# Patient Record
Sex: FEMALE | Race: WHITE | NOT HISPANIC OR LATINO | Employment: FULL TIME
[De-identification: names, ages, dates, MRNs, and addresses within clinical notes are randomized per-mention and may not be internally consistent; named-entity substitution may affect disease eponyms.]

---

## 2017-04-24 ENCOUNTER — RECORDS - HEALTHEAST (OUTPATIENT)
Dept: ADMINISTRATIVE | Facility: OTHER | Age: 22
End: 2017-04-24

## 2017-05-30 ENCOUNTER — RECORDS - HEALTHEAST (OUTPATIENT)
Dept: ADMINISTRATIVE | Facility: OTHER | Age: 22
End: 2017-05-30

## 2017-10-31 ENCOUNTER — RECORDS - HEALTHEAST (OUTPATIENT)
Dept: ADMINISTRATIVE | Facility: OTHER | Age: 22
End: 2017-10-31

## 2017-12-21 ENCOUNTER — RECORDS - HEALTHEAST (OUTPATIENT)
Dept: ADMINISTRATIVE | Facility: OTHER | Age: 22
End: 2017-12-21

## 2017-12-27 ENCOUNTER — RECORDS - HEALTHEAST (OUTPATIENT)
Dept: ADMINISTRATIVE | Facility: OTHER | Age: 22
End: 2017-12-27

## 2018-01-08 ENCOUNTER — RECORDS - HEALTHEAST (OUTPATIENT)
Dept: ADMINISTRATIVE | Facility: OTHER | Age: 23
End: 2018-01-08

## 2018-01-10 ENCOUNTER — RECORDS - HEALTHEAST (OUTPATIENT)
Dept: ADMINISTRATIVE | Facility: OTHER | Age: 23
End: 2018-01-10

## 2018-02-08 ENCOUNTER — RECORDS - HEALTHEAST (OUTPATIENT)
Dept: ADMINISTRATIVE | Facility: OTHER | Age: 23
End: 2018-02-08

## 2018-03-01 ENCOUNTER — DOCUMENTATION ONLY (OUTPATIENT)
Dept: FAMILY MEDICINE | Facility: OTHER | Age: 23
End: 2018-03-01

## 2018-03-01 RX ORDER — DIPHENOXYLATE HYDROCHLORIDE AND ATROPINE SULFATE 2.5; .025 MG/1; MG/1
1 TABLET ORAL DAILY
COMMUNITY
Start: 2015-11-03

## 2018-03-01 RX ORDER — VENLAFAXINE HYDROCHLORIDE 150 MG/1
150 CAPSULE, EXTENDED RELEASE ORAL
COMMUNITY
Start: 2016-05-17

## 2018-03-25 ENCOUNTER — HEALTH MAINTENANCE LETTER (OUTPATIENT)
Age: 23
End: 2018-03-25

## 2018-07-03 ENCOUNTER — OFFICE VISIT - HEALTHEAST (OUTPATIENT)
Dept: FAMILY MEDICINE | Facility: CLINIC | Age: 23
End: 2018-07-03

## 2018-07-03 DIAGNOSIS — F41.1 GAD (GENERALIZED ANXIETY DISORDER): ICD-10-CM

## 2018-07-03 DIAGNOSIS — F31.32 BIPOLAR AFFECTIVE DISORDER, CURRENTLY DEPRESSED, MODERATE (H): ICD-10-CM

## 2018-07-03 DIAGNOSIS — Z97.5 IUD (INTRAUTERINE DEVICE) IN PLACE: ICD-10-CM

## 2018-07-03 DIAGNOSIS — Z13.228 SCREENING FOR METABOLIC DISORDER: ICD-10-CM

## 2018-07-03 DIAGNOSIS — R09.82 PND (POST-NASAL DRIP): ICD-10-CM

## 2018-07-03 DIAGNOSIS — F41.0 PANIC ATTACK: ICD-10-CM

## 2018-07-03 DIAGNOSIS — Z11.3 SCREEN FOR STD (SEXUALLY TRANSMITTED DISEASE): ICD-10-CM

## 2018-07-03 DIAGNOSIS — Z00.00 ENCOUNTER FOR ROUTINE HISTORY AND PHYSICAL EXAM IN FEMALE: ICD-10-CM

## 2018-07-03 LAB
CHOLEST SERPL-MCNC: 208 MG/DL
FASTING STATUS PATIENT QL REPORTED: YES
HBA1C MFR BLD: 4.9 % (ref 3.5–6.1)
HDLC SERPL-MCNC: 74 MG/DL
LDLC SERPL CALC-MCNC: 116 MG/DL
TRIGL SERPL-MCNC: 92 MG/DL
TSH SERPL DL<=0.005 MIU/L-ACNC: 0.86 UIU/ML (ref 0.3–5)

## 2018-07-03 ASSESSMENT — MIFFLIN-ST. JEOR: SCORE: 1517.45

## 2018-07-06 LAB
C TRACH DNA SPEC QL PROBE+SIG AMP: NEGATIVE
N GONORRHOEA DNA SPEC QL NAA+PROBE: NEGATIVE

## 2018-07-09 ENCOUNTER — COMMUNICATION - HEALTHEAST (OUTPATIENT)
Dept: FAMILY MEDICINE | Facility: CLINIC | Age: 23
End: 2018-07-09

## 2018-10-09 ENCOUNTER — HOSPITAL ENCOUNTER (OUTPATIENT)
Dept: BEHAVIORAL HEALTH | Facility: CLINIC | Age: 23
Discharge: HOME | End: 2018-10-09
Attending: PSYCHOLOGIST | Admitting: PSYCHOLOGIST
Payer: COMMERCIAL

## 2018-10-09 PROCEDURE — 90791 PSYCH DIAGNOSTIC EVALUATION: CPT

## 2018-10-09 ASSESSMENT — PAIN SCALES - GENERAL: PAINLEVEL: NO PAIN (0)

## 2018-10-09 NOTE — PROGRESS NOTES
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 female.  She lives with a friend in [place] and has been in that apartment since August.  Prior to that, she was living in [place] for School.  She graduated with a degree in [employer] and moved to the [facility].  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  She is currently on short term disability with work and would like to return when symptoms are better managed.  Her support people include a variety of friends and an aunt.  Her professional supports include Ronit BAILEY (Therapist and [facility]) and is interested in getting a psychiatrist.    Jazz is diagnosed with bipolar II, currently depressed and an unspecified anxiety disorder.  She reported feeling sad, hopeless, helpless, worthless, irritable,andnervous.  She has tension, low interest in activities, low self confidence, and is uneasy in crowds.  She reports thoughts around breaking or smashing things, racing thoughts, and ruminations.  She has difficulties with concentration and  memory but is sometimes hyperfocused on tasks.  She reports having no to low appetite and sleeping too much.  She has low motivation and sudden changes in energy.  She reports crying easily, avoiding places due to fear, and some higher than usual self confidence.  She reports making poor decisions and impulsivity in substance use (alcohol and marijuana) and sex.  She denies symptoms of psychosis or trauma.  She reports history of drinking alcohol daily to the point of intoxication.  She reports current use is a few times a week but knows it is to help cope with symptoms and wants to use skills to cope rather than substances.  She reports smoking daily to cope with symptoms of low appetite and anxiety and wants to use skills to cope rather than substances.    Jazz has no history of psychiatric hospitalizations.  She reports one suicide attempt (9/21/18) via overdose on her own medications.  She was brought to the ER and released.  She reports it was done impulsively and she had not been planning it.  She reports  Current suicidal ideation is low with no plan or intent to act but that impulsivity can cause this to change quickly.  She reports history of cutting starting at age 17 with intermittent action and current urges during times of stress.  Most recent action was same day as suicide attempt.  She is self referred to work on symptoms of depression and anxiety.  She reports current stressors include finances, life changes (graduating, moving, starting a new job, and a romantic break up), conflict with family, and drinking and smoking too much.  She will be placed in the partial program starting 10/10/2018    Prognosis is Fair. Without the recommended intervention, the client is likely to experience the following consequences of their symptoms: increased symptoms, decreased functioning, worsening of symptoms, need for higher level of care.    Referrals to services required by statute or rule:   Report to  child/adult protection services was NA.   Referral to another professional/service is not indicated at this time..    Program Recommendation: [facility] () .  Starting 10/10/18    Assessment Completed by: Lisette Anaya

## 2018-10-10 ENCOUNTER — HOSPITAL ENCOUNTER (OUTPATIENT)
Dept: BEHAVIORAL HEALTH | Facility: CLINIC | Age: 23
End: 2018-10-10
Attending: PSYCHIATRY & NEUROLOGY
Payer: COMMERCIAL

## 2018-10-10 PROCEDURE — H0035 MH PARTIAL HOSP TX UNDER 24H: HCPCS

## 2018-10-10 ASSESSMENT — ANXIETY QUESTIONNAIRES
7. FEELING AFRAID AS IF SOMETHING AWFUL MIGHT HAPPEN: SEVERAL DAYS
5. BEING SO RESTLESS THAT IT IS HARD TO SIT STILL: SEVERAL DAYS
GAD7 TOTAL SCORE: 12
IF YOU CHECKED OFF ANY PROBLEMS ON THIS QUESTIONNAIRE, HOW DIFFICULT HAVE THESE PROBLEMS MADE IT FOR YOU TO DO YOUR WORK, TAKE CARE OF THINGS AT HOME, OR GET ALONG WITH OTHER PEOPLE: VERY DIFFICULT
1. FEELING NERVOUS, ANXIOUS, OR ON EDGE: NEARLY EVERY DAY
2. NOT BEING ABLE TO STOP OR CONTROL WORRYING: SEVERAL DAYS
3. WORRYING TOO MUCH ABOUT DIFFERENT THINGS: SEVERAL DAYS
6. BECOMING EASILY ANNOYED OR IRRITABLE: NEARLY EVERY DAY

## 2018-10-10 ASSESSMENT — PATIENT HEALTH QUESTIONNAIRE - PHQ9: 5. POOR APPETITE OR OVEREATING: MORE THAN HALF THE DAYS

## 2018-10-11 ENCOUNTER — HOSPITAL ENCOUNTER (OUTPATIENT)
Dept: BEHAVIORAL HEALTH | Facility: CLINIC | Age: 23
End: 2018-10-11
Attending: PSYCHIATRY & NEUROLOGY
Payer: COMMERCIAL

## 2018-10-11 PROCEDURE — H0035 MH PARTIAL HOSP TX UNDER 24H: HCPCS

## 2018-10-11 ASSESSMENT — ANXIETY QUESTIONNAIRES: GAD7 TOTAL SCORE: 12

## 2018-10-11 ASSESSMENT — PATIENT HEALTH QUESTIONNAIRE - PHQ9: SUM OF ALL RESPONSES TO PHQ QUESTIONS 1-9: 20

## 2018-10-11 NOTE — PROGRESS NOTES
Area of Treatment Focus:  Symptom Management and Develop / Improve Independent Living Skills    Therapeutic Interventions/Treatment Strategies:  Support, Feedback, Safety Assessment, Structured Activity, Problem Solving and Education    Response to Treatment Strategies:  Accepted Feedback, Gave Feedback, Listened, Focused on Goals, Attentive, Accepted Support and Alert    Name of Group:  Group therapy [id number], Interpersonal Effectiveness [id number]  Group Participants:     Description and Outcome:  Group therapy  Client reported feeling upset.  She said she had a difficult interaction with her mother.  She said her mother is telling people that the client does not know about her suicide attempt and the client feels exposed. She said she believes her mother is doing it to get attention.  She said she has never been parented by her mother and her mother has no boundaries.  She said that brought out guilt and shame about what happened when she was manic and  as a result she had urges to use alcohol.  She said she did not drink but it was difficult.  Discussed acceptance and ways to manage regret to move forward with her recovery.  No SI reported.  Client verbalized understanding of session content by problem solving with group members and accepting feedback.  Interpersonal Effectiveness  Client participated in an exercise on values clarification.  Client used a card sort to examine important that are driving behavior, evaluation, and attitudes toward herself and her interactions.  Client reported difficulty choosing which values are important to her as the current stress makes everything seem more important.  Client benefitted from this group by problem solving and increasing self-awareness.      Is this a Weekly Review of the Progress on the Treatment Plan?  No

## 2018-10-11 NOTE — PROGRESS NOTES
Area of Treatment Focus:  Symptom Management and Develop / Improve Independent Living Skills    Therapeutic Interventions/Treatment Strategies:  Support, Feedback, Safety Assessment, Structured Activity, Problem Solving and Education    Response to Treatment Strategies:  Accepted Feedback, Gave Feedback, Listened, Focused on Goals, Attentive, Accepted Support and Alert    Name of Group:  Self-awareness 2-3    Group Participants: 5/5    Description and Outcome:  Discussed concept of self-compassion, reviewed three core elements of self-compassion, discussed value of self-compassion in reducing stress and supporting mental health, considered options for improving self-compassion.    Is this a Weekly Review of the Progress on the Treatment Plan?  No

## 2018-10-12 ENCOUNTER — HOSPITAL ENCOUNTER (OUTPATIENT)
Dept: BEHAVIORAL HEALTH | Facility: CLINIC | Age: 23
End: 2018-10-12
Attending: PSYCHIATRY & NEUROLOGY
Payer: COMMERCIAL

## 2018-10-12 DIAGNOSIS — F31.81 BIPOLAR 2 DISORDER (H): ICD-10-CM

## 2018-10-12 DIAGNOSIS — F31.81 BIPOLAR II DISORDER (H): Primary | ICD-10-CM

## 2018-10-12 PROCEDURE — H0035 MH PARTIAL HOSP TX UNDER 24H: HCPCS

## 2018-10-12 RX ORDER — LAMOTRIGINE 25 MG/1
TABLET ORAL
Qty: 240 TABLET | Refills: 0 | Status: SHIPPED | OUTPATIENT
Start: 2018-10-12

## 2018-10-12 NOTE — PROGRESS NOTES
Area of Treatment Focus:  Symptom Management and Develop / Improve Independent Living Skills    Therapeutic Interventions/Treatment Strategies:  Support, Feedback, Safety Assessment, Structured Activity, Problem Solving and Education    Response to Treatment Strategies:  Accepted Feedback, Gave Feedback, Listened, Focused on Goals, Attentive, Accepted Support and Alert    Name of Group:  Group psychotherapy 9-10, Network Development 10-11    Group Participants: 5/5    Description and Outcome:  Jazz participated in daily mindfulness practice. Says she spent time with her best friend's sister yesterday, went to get some Halloween things and this was enjoyable. Also set boundaries with her mother, who apparently called to tell Jazz how difficult Jazz's suicide attempt and struggles have been for her. Says she has figured out that she just cannot help anyone else with processing their needs and feelings about the situation right now. Affirmed this. Says she went to bed early and has been doing so of late, sleeps a lot more than usual. Talked about having mixed mood states: depression but while feeling really energetic and agitated. Says this state is the kind she was in when she impulsively overdosed. Shared what she remembers of that event and that she reached out to a friend when she realized she had done something she did not want to do, which led to hospitalization and then this program. Talked about how her roommate has been very distant and avoidant of her since then, though this person is considered her best friend. She notes that this friend's sister has attempted suicide 11 times and so the friend is likely partly reacting to that. Discussed value of support when stabilizing a health crisis and possibility of asking support people about how they feel comfortable being supportive while she works on getting better. Noted that there is often negotiation involved and it is okay to ask for support.  Encouraged Jazz to go at her own pace and proceed in a way that feels okay to her.    Discussed value of effective support in managing mental health issues, as well as common behavior of people who have depression to never ask for support. Completed exercise to begin identifying support needs and possible ways to work through barriers to asking for support.    Is this a Weekly Review of the Progress on the Treatment Plan?  No

## 2018-10-12 NOTE — PROGRESS NOTES
Area of Treatment Focus:  Symptom Management, Develop / Improve Independent Living Skills     Therapeutic Interventions/Treatment Strategies:  Support, Feedback, Structured Activity, Problem Solving and Education    Response to Treatment Strategies:  Accepted Feedback, Listened, Focused on Goals, Attentive, Accepted Support     Group Participants: 5 of 6 absence due to hospitalization  Description and Outcome:  Relax and Review    Name of Group:  Relax and Review [id number]  Client participated in an education group on values.  Discussed client s identified values and the affect her values are having on her life.  Information was presented on flexibility, adaptability, and change.  Client identified a value she wants to focus on increasing in her life and created actions steps for achieving her goal.  Client to work on increasing self-acceptance. Client benefitted from the session by creating action steps and problem solving with the group to increase follow through and work on goal.    Is this a Weekly Review of the Progress on the Treatment Plan?  Yes.      Are Treatment Plan Goals being addressed?  Yes, continue treatment goals      Are Treatment Plan Strategies to Address Goals Effective?  Yes, continue treatment strategies

## 2018-10-12 NOTE — PROGRESS NOTES
"Adult Mental Health Partial Hospitalization Group Therapy Progress Note     Date: 10/10/18    Client Initial Individualized Goals for Treatment: \"Stability,  Be able to focus. Increase self worth\".     Initial Treatment suggestions for the client during the time between Diagnostic Assessment and completion of the Individualized Treatment Plan:  Follow Safety Plan  Abstain from Substance Use   Ask for more information, support and/or assistance as needed.  Follow up with providers/community supports as needed: Therapist weekly, Looking for psychiatry,   Report increases or changes in symptoms to staff.  Report any personal safety concerns to staff.   Take medications as prescribed.  Report medication changes and/or side effects to staff.  Attend and participate in groups as scheduled or notify staff if unable to do so.  Report any use of substances to staff as this may impact your symptoms and/or personal safety.  Notify staff if you have any other issues that need to be addressed. This may include any current abuse / neglect / exploitation or other vulnerability.  Follow recommendations of your treatment team and discuss concerns if not in  agreement.     Area of Treatment Focus:  Symptom Management and Develop / Improve Independent Living Skills    Therapeutic Interventions/Treatment Strategies:  Support, Safety Assessments, Structured Activity, Problem Solving, Clarification and Education    Response to Treatment Strategies:  Accepted Feedback, Listened, Focused on Goals, Attentive, Accepted Support and Alert    Name of Group:  OT life skills clinic: mental health management  Time: 1:00-1:50  Group member attendance: 5 of 7     Description and Outcome: Jazz attended and participated in an structured life skills group where purposeful experiential intervention focuses on psychoeducation, exploration, practice, and generalizing taught independent living skills. Through the use of supportive social interactions, " structured therapeutic and functional tasks in context, group members work towards stabilizing and managing mental health symptoms for improved participation and function in valued roles, routines, relationships, and independent living.     Provided client with rationale and purpose of the OT life skills clinic and possible ways to use her time and energy to work on her mental health goals. Jazz endorses finding creating and expressing herself with media is a big part of who she is and easily chooses and initiates an unstructured self expression activity where she processes her bipolar disease. She easily exchanges validation and support with her peers. Anxious affect but able to stay regulated throughout session. Jazz  would benefit from additional opportunities to practice the content to be able to generalize it to her everyday life with increased intentionality, consistency, and efficacy in support of her psychiatric recovery. Will continue to monitor and assess and set appropriate goals with Jazz in subsequent sessions.     Is this a Weekly Review of the Progress on the Treatment Plan?  Yes.      Are Treatment Plan Goals being addressed?  Yes, continue treatment goals      Are Treatment Plan Strategies to Address Goals Effective?  Yes, continue treatment strategies      Are there any current contracts in place?  No

## 2018-10-12 NOTE — PROGRESS NOTES
"Adult Mental Health Partial Hospitalization Group Therapy Progress Note     Date: 10/11/18    Client Initial Individualized Goals for Treatment: \"Stability,  Be able to focus. Increase self worth\".     Initial Treatment suggestions for the client during the time between Diagnostic Assessment and completion of the Individualized Treatment Plan:  Follow Safety Plan  Abstain from Substance Use   Ask for more information, support and/or assistance as needed.  Follow up with providers/community supports as needed: Therapist weekly, Looking for psychiatry,   Report increases or changes in symptoms to staff.  Report any personal safety concerns to staff.   Take medications as prescribed.  Report medication changes and/or side effects to staff.  Attend and participate in groups as scheduled or notify staff if unable to do so.  Report any use of substances to staff as this may impact your symptoms and/or personal safety.  Notify staff if you have any other issues that need to be addressed. This may include any current abuse / neglect / exploitation or other vulnerability.  Follow recommendations of your treatment team and discuss concerns if not in  agreement.     Area of Treatment Focus:  Symptom Management and Develop / Improve Independent Living Skills    Therapeutic Interventions/Treatment Strategies:  Support, Safety Assessments, Structured Activity, Problem Solving, Clarification and Education    Response to Treatment Strategies:  Accepted Feedback, Listened, Focused on Goals, Attentive, Accepted Support and Alert    Name of Group:  OT life skills clinic: mental health management  Time: 1:00-1:50  Group member attendance: 5 of 6     Description and Outcome: Jazz attended and participated in an structured life skills group where purposeful experiential intervention focuses on psychoeducation, exploration, practice, and generalizing taught independent living skills. Through the use of supportive social interactions, " structured therapeutic and functional tasks in context, group members work towards stabilizing and managing mental health symptoms for improved participation and function in valued roles, routines, relationships, and independent living.     Jazz continues work on her creative expression project and shares her process with her peers. She presents with a calm even affect in the clinic today, stating she feels very comfortable with this type of therapeutic approach. She asks questions about DBT, wondering if it would be a good fit for her. She easily exchanges validation and support with her peers. Jazz  would benefit from additional opportunities to practice the content to be able to generalize it to her everyday life with increased intentionality, consistency, and efficacy in support of her psychiatric recovery. Will continue to monitor and assess and set appropriate goals with Jazz in subsequent sessions.     Is this a Weekly Review of the Progress on the Treatment Plan?  No.
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
## 2018-10-15 ENCOUNTER — HOSPITAL ENCOUNTER (OUTPATIENT)
Dept: BEHAVIORAL HEALTH | Facility: CLINIC | Age: 23
End: 2018-10-15
Attending: PSYCHIATRY & NEUROLOGY
Payer: COMMERCIAL

## 2018-10-15 PROCEDURE — H0035 MH PARTIAL HOSP TX UNDER 24H: HCPCS

## 2018-10-15 NOTE — PROGRESS NOTES
Adult Mental Health Outpatient   Group Therapy Progress Note     Client Initial Individualized Goals for Treatment:      See Initial Treatment suggestions for the client during the time between Diagnostic Assessment and completion of the Master Individualized Treatment Plan.     Initial Treatment suggestions for the client during the time between Diagnostic Assessment and completion of the Individualized Treatment Plan:  Follow Safety Plan   Ask for more information, support and/or assistance as needed.  Follow up with providers/community supports as needed:   Report increases or changes in symptoms to staff.  Report any personal safety concerns to staff.   Take medications as prescribed.  Report medication changes and/or side effects to staff.  Attend and participate in groups as scheduled or notify staff if unable to do so.  Report any use of substances to staff as this may impact your symptoms and/or perrsonal safety.  Notify staff if you have any other issues that need to be addressed. This may include any current abuse / neglect / exploitation or other vulnerability.  Follow recommendations of your treatment team and discuss concerns if not in agreement.  Area of Treatment Focus:  Symptom Management, Develop / Improve Independent Living Skills      Therapeutic Interventions/Treatment Strategies:  Support, Feedback, Structured Activity, Problem Solving and Education     Response to Treatment Strategies:  Accepted Feedback, Listened, Focused on Goals, Attentive, Accepted Support      Group Participants: 5 of 6 absence due to hospitalization  Description and Outcome:  Relax and Review     Name of Group:  Relax and Review [id number]  Client participated in an education group on values.  Discussed client s identified values and the affect her values are having on her life.  Information was presented on flexibility, adaptability, and change.  Client identified a value she wants to focus on increasing in her life and  created actions steps for achieving her goal.  Client to work on increasing self-acceptance. Client benefitted from the session by creating action steps and problem solving with the group to increase follow through and work on goal.     Is this a Weekly Review of the Progress on the Treatment Plan?  Yes.       Are Treatment Plan Goals being addressed?  Yes, continue treatment goals        Are Treatment Plan Strategies to Address Goals Effective?  Yes, continue treatment strategies

## 2018-10-15 NOTE — PROGRESS NOTES
"Adult Mental Health Outpatient Group Therapy Progress Note        Client Initial Individualized Goals for Treatment: \"Stability,  Be able to focus. Increase self worth\".     See Initial Treatment suggestions for the client during the time between Diagnostic Assessment and completion of the Master Individualized Treatment Plan.     Initial Treatment suggestions for the client during the time between Diagnostic Assessment and completion of the Individualized Treatment Plan:  Follow Safety Plan   Ask for more information, support and/or assistance as needed.  Follow up with providers/community supports as needed:   Report increases or changes in symptoms to staff.  Report any personal safety concerns to staff.   Take medications as prescribed.  Report medication changes and/or side effects to staff.  Attend and participate in groups as scheduled or notify staff if unable to do so.  Report any use of substances to staff as this may impact your symptoms and/or perrsonal safety.  Notify staff if you have any other issues that need to be addressed. This may include any current abuse / neglect / exploitation or other vulnerability.  Follow recommendations of your treatment team and discuss concerns if not in agreement.      Area of Treatment Focus:  Symptom Management and Develop / Improve Independent Living Skills    Therapeutic Interventions/Treatment Strategies:  Support, Feedback, Safety Assessment, Structured Activity, Problem Solving and Education    Response to Treatment Strategies:  Accepted Feedback, Gave Feedback, Listened, Focused on Goals, Attentive, Accepted Support and Alert    Name of Group:  Anxiety Lab 2-3    Group Participants:  6 of 6    Description and Outcome:  Discussed impact of thinking about past and future on anxiety and emotional stress levels, as well as option of using a here/now focus to shift away from this type of triggering thinking. Reviewed ways to practice mindfulness that can promote " that shift to the moment to moment focus.    Is this a Weekly Review of the Progress on the Treatment Plan?  No

## 2018-10-15 NOTE — PROGRESS NOTES
"[facility]   Dr. Combs's Psychiatric Progress Note  10/15/2018      Patient:  Jazz Nuñez   Medical Record Number:  [id number]  :  1995          Interim History:   The patient's care was discussed with the treatment team and chart notes were reviewed.  Weekend was pretty good.  Slept 12 hours on Fri. Night.  Sat. Didn't sleep til 4 AM and slept 4 hours.  Cleaned last night.  Made self sleep last night and got 6 hours.  She aims for 7 to 8 hours.  Some chest tightness when gets anxious.  Some OCD: washing hand more than usual.  Has dark thoughts about \"death in general.\"  No plans to kill self.      Psychiatric ROS:  Mood:   pretty good today;  she was more down on Friday;  anxious at time;    Sleep:  All over the board;   Appetite:normal  Eating:normal  Energy Level:  good  Concentration/Memory Problems:    good;  gets \"hyperfocused\" on one thing;    Suicidal Thoughts:No  Homicidal Thoughts:No  Psychotic Symptoms: No  Medication Side Effects:No  Medication Compliance:Yes   Physical Complaints:negative         Medications:     PAST PSYCH MEDS:  Prozac, Wellbutrin, Effexor, lithium, Zoloft, Ativan, Paxil, Celexa, Lexapro, BuSpar, Xanax, Valium, Lamictal,     Current Outpatient Prescriptions   Medication Sig     BuPROPion HCl (WELLBUTRIN PO) Take by mouth 2 times daily    Gabapetin  Take 1 po tid      lamoTRIgine (LAMICTAL) 25 MG tablet Take 1 po daily x 1 week then increase every week by 25mg until reaching target dose of 200 mg/d     Multiple Vitamin (MULTI-VITAMINS) TABS Take 1 tablet by mouth daily     venlafaxine (EFFEXOR-XR) 150 MG 24 hr capsule 150 mg daily with food     No current facility-administered medications for this encounter.              Allergies:   Not on File         Psychiatric Examination:   There were no vitals taken for this visit.  Weight is 0 lbs 0 oz  There is no height or weight on file to calculate BMI.    Appearance:  awake, alert and " adequately groomed  Attitude:  cooperative  Eye Contact:  looking down a lot  Mood:    depressed and anxious  Affect:  brighter than what she reports;  mood incongruent;    Speech:  clear, coherent  Psychomotor Behavior:  no evidence of tardive dyskinesia, dystonia, or tics  Throught Process:  logical  Associations:  no loose associations  Thought Content:  no evidence of suicidal ideation or homicidal ideation and no evidence of psychotic thought  Insight:  good  Judgement:  intact  Oriented to:  time, person, and place  Attention Span and Concentration:  intact  Recent and Remote Memory:  intact  Gait:Normal    Risk/Potential for Dangerousness:  Multiple Active Diagnoses:HIGH  Self Care:LOW  Suicide:LOW  Assault:LOW  Self Injurious Behaviors:LOW  Inappropriate Sexual Behavior:LOW         Labs:   No results found for this or any previous visit (from the past 24 hour(s)).     No results found for this or any previous visit (from the past 1008 hour(s)).      Impression:   This is a 23 year old female continues PHP for mood stabilization.           DIagnoses:     Axis I:  Bipolar II disorder, depressed. Rule out bipolar 1 disorder, depressed.  History of obsessive-compulsive disorder.  Rule out alcohol use disorder.   Axis II:  Rule out personality disorder, not otherwise specified.   Axis III:  No diagnosis.            Plan:     Continue [facility].   Continued Effexor, but discontinued Wellbutrin.   Started Lamictal 25 mg daily and titrate weekly by 25 mg until reaching target dosage of 200 mg daily.   Continue gabapentin for now.   Ms. Nuñez plans to follow up with her current therapist and get a new psychiatrist at her therapist's office on [address] in [place].     Allan Combs MD

## 2018-10-15 NOTE — PROGRESS NOTES
Adult Mental Health Outpatient   Group Therapy Progress Note    Client Initial Individualized Goals for Treatment:     See Initial Treatment suggestions for the client during the time between Diagnostic Assessment and completion of the Master Individualized Treatment Plan.    Initial Treatment suggestions for the client during the time between Diagnostic Assessment and completion of the Individualized Treatment Plan:  Follow Safety Plan   Ask for more information, support and/or assistance as needed.  Follow up with providers/community supports as needed:   Report increases or changes in symptoms to staff.  Report any personal safety concerns to staff.   Take medications as prescribed.  Report medication changes and/or side effects to staff.  Attend and participate in groups as scheduled or notify staff if unable to do so.  Report any use of substances to staff as this may impact your symptoms and/or perrsonal safety.  Notify staff if you have any other issues that need to be addressed. This may include any current abuse / neglect / exploitation or other vulnerability.  Follow recommendations of your treatment team and discuss concerns if not in agreement.  Area of Treatment Focus:  Symptom Management, Develop / Improve Independent Living Skills     Therapeutic Interventions/Treatment Strategies:  Support, Feedback, Structured Activity, Problem Solving and Education    Response to Treatment Strategies:  Accepted Feedback, Listened, Focused on Goals, Attentive, Accepted Support     Group Participants: 5 of 6 absence due to hospitalization  Description and Outcome:  Relax and Review    Name of Group:  Relax and Review [id number]  Client participated in an education group on values.  Discussed client s identified values and the affect her values are having on her life.  Information was presented on flexibility, adaptability, and change.  Client identified a value she wants to focus on increasing in her life and created  actions steps for achieving her goal.  Client to work on increasing self-acceptance. Client benefitted from the session by creating action steps and problem solving with the group to increase follow through and work on goal.    Is this a Weekly Review of the Progress on the Treatment Plan?  Yes.      Are Treatment Plan Goals being addressed?  Yes, continue treatment goals      Are Treatment Plan Strategies to Address Goals Effective?  Yes, continue treatment strategies

## 2018-10-15 NOTE — PROGRESS NOTES
"Adult Mental Health Outpatient Group Therapy Progress Note        Client Initial Individualized Goals for Treatment: \"Stability,  Be able to focus. Increase self worth\".     See Initial Treatment suggestions for the client during the time between Diagnostic Assessment and completion of the Master Individualized Treatment Plan.     Initial Treatment suggestions for the client during the time between Diagnostic Assessment and completion of the Individualized Treatment Plan:  Follow Safety Plan   Ask for more information, support and/or assistance as needed.  Follow up with providers/community supports as needed:   Report increases or changes in symptoms to staff.  Report any personal safety concerns to staff.   Take medications as prescribed.  Report medication changes and/or side effects to staff.  Attend and participate in groups as scheduled or notify staff if unable to do so.  Report any use of substances to staff as this may impact your symptoms and/or perrsonal safety.  Notify staff if you have any other issues that need to be addressed. This may include any current abuse / neglect / exploitation or other vulnerability.  Follow recommendations of your treatment team and discuss concerns if not in agreement.      Area of Treatment Focus:  Symptom Management and Develop / Improve Independent Living Skills    Therapeutic Interventions/Treatment Strategies:  Support, Feedback, Safety Assessment, Structured Activity, Problem Solving and Education    Response to Treatment Strategies:  Accepted Feedback, Gave Feedback, Listened, Focused on Goals, Attentive, Accepted Support and Alert    Name of Group:  Group therapy [id number], Mental Health Management [id number]  Group Participants:  6 of 6    Description and Outcome:  Group therapy  Client reported having a quiet weekend.  She said she worked to stay sober despite urges to use.  She said her anxiety was very high and was physically distressing.  She was able to use " some meditation to calm herself and it seemed to help.  She said she was alone most of the weekend and did some feelings work and otherwise tried to decrease tension.  She reported no SI.  Client verbalized understanding of session content by problem solving with group members and accepting feedback.  Mental Health Management  Client participated in an education session on self-validation.  The biospsychosocial model of emotion regulation was presented in relation to emotions affecting thinking.  Discussed the effect of heightened emotions on self-talk and the increased tendency to distort thinking.  Client related experiences of distorted thinking and group members.  Self-validation was presented as a way to decrease emotions and increase clarity in thought.  Validation was defined and steps to practice for increased validation were presented.  Client reported understanding the information presented and willingness to try the skills. Client would benefit from continued practice of self-validation.    Is this a Weekly Review of the Progress on the Treatment Plan?  No

## 2018-10-16 ENCOUNTER — HOSPITAL ENCOUNTER (OUTPATIENT)
Dept: BEHAVIORAL HEALTH | Facility: CLINIC | Age: 23
End: 2018-10-16
Attending: PSYCHIATRY & NEUROLOGY
Payer: COMMERCIAL

## 2018-10-16 PROCEDURE — H0035 MH PARTIAL HOSP TX UNDER 24H: HCPCS

## 2018-10-16 NOTE — PROGRESS NOTES
"Adult Mental Health Partial Hospitalization Group Therapy Progress Note     Date: 10/12/18    Client Initial Individualized Goals for Treatment: \"Stability,  Be able to focus. Increase self worth\".     Initial Treatment suggestions for the client during the time between Diagnostic Assessment and completion of the Individualized Treatment Plan:  Follow Safety Plan  Abstain from Substance Use   Ask for more information, support and/or assistance as needed.  Follow up with providers/community supports as needed: Therapist weekly, Looking for psychiatry,   Report increases or changes in symptoms to staff.  Report any personal safety concerns to staff.   Take medications as prescribed.  Report medication changes and/or side effects to staff.  Attend and participate in groups as scheduled or notify staff if unable to do so.  Report any use of substances to staff as this may impact your symptoms and/or personal safety.  Notify staff if you have any other issues that need to be addressed. This may include any current abuse / neglect / exploitation or other vulnerability.  Follow recommendations of your treatment team and discuss concerns if not in  agreement.     Area of Treatment Focus:  Symptom Management and Develop / Improve Independent Living Skills    Therapeutic Interventions/Treatment Strategies:  Support, Safety Assessments, Structured Activity, Problem Solving, Clarification and Education    Response to Treatment Strategies:  Accepted Feedback, Listened, Focused on Goals, Attentive, Accepted Support and Alert    Name of Group:  OT life skills: mental health management  Time: 1:00-1:50  Group member attendance: 5 of 6     Description and Outcome: Jazz attended and participated in an structured life skills group where purposeful experiential intervention focuses on psychoeducation, exploration, practice, and generalizing taught independent living skills. Through the use of supportive social interactions, " structured therapeutic and functional tasks in context, group members work towards stabilizing and managing mental health symptoms for improved participation and function in valued roles, routines, relationships, and independent living.     Focus of today s session is on the psychological benefits of an experiential embodied mind practice to gain self-awareness of how mindfulness can help with emotional self-regulation.  Psychoeducation on mindfulness and ways to be mindful were reviewed and discussed, including active meditation practices such as Yoga and Naresh Chi. Naresh Chi is an evidence based practice that employs structured rhythmic patterns of movement that are synchronized with the individual's breathing, focused attention, and principles around the quality of the movement and postures (body based) to activate the parasympathetic nervous system and calm the mind. The teaching and practice of the active meditation practice of Naresh Chi Sun Style was provided by writer (Certified Naresh Chi for ).  All aspects of the practice were adapted to individual client needs with an emphasis on safety and comfort to enhance the practice for improved engagement in the embodied mind practice. Jazz demonstrates good focus and reports feeling comfortable with the gentle movements. Jazz reflects on how it reminds her of dance (ballet).  Jazz would benefit from additional opportunities to practice the content to be able to generalize it to her everyday life with increased intentionality, consistency, and efficacy in support of her psychiatric recovery.  Will continue to monitor and assess and set appropriate goals with Jazz in subsequent sessions.     Is this a Weekly Review of the Progress on the Treatment Plan?  Yes.      Are Treatment Plan Goals being addressed?  Yes, continue treatment goals      Are Treatment Plan Strategies to Address Goals Effective?  Yes, continue treatment  strategies      Are there any current contracts in place?  No

## 2018-10-16 NOTE — PROGRESS NOTES
"Adult Mental Health Partial Hospitalization Group Therapy Progress Note     Date: 10/15/18    Client Initial Individualized Goals for Treatment: \"Stability,  Be able to focus. Increase self worth\".     Treatment Goals:  1) Safety: Jazz will:    Notify staff when needing assistance to develop or implement a coping plan to manage suicidal or self injurious urges.    Use coping plan for safety, as needed.  2) Symptom Management: Jazz will:      Jazz will identify and use 2-3 skills for coping with anxiety.    Working on developing skills to manage and stabilize mood.     Work on developing self-compassion skills and boundaries.  3) In LIfe Skills Jazz will:     Learn, practice, apply 2 skills/strategies that support lifestyle balance/structure/routine with an emphasis on self compassion    Learn, practice, generalize 2 sensory or mindfulness based self regulation skills to decrease distress and improve participation in meaningful role, routines, and relationships.  4) Will develop an aftercare / transition plan by day of discharge    Area of Treatment Focus:  Symptom Management and Develop / Improve Independent Living Skills    Therapeutic Interventions/Treatment Strategies:  Support, Safety Assessments, Structured Activity, Problem Solving, Clarification and Education    Response to Treatment Strategies:  Accepted Feedback, Listened, Focused on Goals, Attentive, Accepted Support and Alert    Name of Group:  OT life skills: Goal integration  Time: 1:00-1:50  Group member attendance: 6 of 6     Description and Outcome: Jzaz attended and participated in an structured life skills group where purposeful experiential intervention focuses on psychoeducation, exploration, practice, and generalizing taught independent living skills. Through the use of supportive social interactions, structured therapeutic and functional tasks in context, group members work towards stabilizing and managing mental health " symptoms for improved participation and function in valued roles, routines, relationships, and independent living.     To support progress towards treatment goals and psychiatric recovery, group members were led through a structured process to integrate new learning, skills, and emerging self-awareness into their daily and weekly life. The process includes:   1. Brief psychoeducation on evidence around the neuro-science of change with regards to setting small achievable goals.  2. Brief education on components of self-compassion in context of setting goals provided.  3. Brief psychoeducation and practice of 2 positive psychology skills: GLAD and 3 good things.   4. Write smart goals for the next week in 3 of the following areas: self-compassion, mindfulness, connections, wellness, lifestyle balance, discharge planning, and coping skills.   5. Integrate the goals into their life using a provided weekly planner. Encouraged to highlight the highlights as the week progresses.  6. Sharing intentions with the group for accountability, and to also receive support from peers as the week progresses  Jazz presents with calm even affect today. she was able to set the following 3 SMART goals for the week in support of his psychiatric recovery:   1) Self Compassion: Do daily highlight of highlights this week.   2) Mindfulness: Meditate daily  3) Connecting: meet up with a friend 2x this week.    4) Self care: Complete the Sensory Profile with writer.      Validation and support provided. Jazz would benefit from additional opportunities to practice the content to be able to generalize it to her everyday life with increased intentionality, consistency, and efficacy in support of her psychiatric recovery. She worked towards goal number 3 today.    Is this a Weekly Review of the Progress on the Treatment Plan?  No.

## 2018-10-16 NOTE — PROGRESS NOTES
"Adult Mental Health Outpatient Group Therapy Progress Note        Client Initial Individualized Goals for Treatment: \"Stability,  Be able to focus. Increase self worth\".     See Initial Treatment suggestions for the client during the time between Diagnostic Assessment and completion of the Master Individualized Treatment Plan.     Initial Treatment suggestions for the client during the time between Diagnostic Assessment and completion of the Individualized Treatment Plan:  Follow Safety Plan   Ask for more information, support and/or assistance as needed.  Follow up with providers/community supports as needed:   Report increases or changes in symptoms to staff.  Report any personal safety concerns to staff.   Take medications as prescribed.  Report medication changes and/or side effects to staff.  Attend and participate in groups as scheduled or notify staff if unable to do so.  Report any use of substances to staff as this may impact your symptoms and/or perrsonal safety.  Notify staff if you have any other issues that need to be addressed. This may include any current abuse / neglect / exploitation or other vulnerability.  Follow recommendations of your treatment team and discuss concerns if not in agreement.      Area of Treatment Focus:  Symptom Management and Develop / Improve Independent Living Skills    Therapeutic Interventions/Treatment Strategies:  Support, Feedback, Safety Assessment, Structured Activity, Problem Solving and Education    Response to Treatment Strategies:  Accepted Feedback, Gave Feedback, Listened, Focused on Goals, Attentive, Accepted Support and Alert    Name of Group:  Group psychotherapy 1-2, Support Network Education 3-6    Group Participants:  5 of 6    Description and Outcome:  Jazz participated in daily mindfulness practice. Says she had a \"bad day\" yesterday and earlier today.  Says she had hoped to go to [facility] and update her license this morning, but felt very anxious and so " decided against it and just went back to bed. Says she slept 12 hours all told. She has had some interactions with a male friend who she has told she cannot talk to him about how she is doing or what is happening with her because he has made some comments to her that feel very uncomfortable to her about her scars and about her depression, leaving her with the impression that he fetishizes her illness or maybe romanticizes it in some way and this feels disgusting to her. She has been very angry about his continuing to try to contact take her every day despite telling him to stop trying to talk to her. She struggles with feeling bad for telling him she cannot talk to him anymore period. Discussed importance of boundaries and need for safety in relationships and recognizing that it is okay to take steps to increase safety. Briefly noted ways that can be done: verbally setting limits, ignoring, getting support from people in support system, even calling police in the case of stalking that does not stop.     Participated in viewing film on experience of depression, importance and role of support, importance of effective treatment. Discussed general ways to be supportive of people dealing with depression, anxiety, mental health problems. Completed exercise to more clearly articulate her support needs with close friend she invited to this group.    Is this a Weekly Review of the Progress on the Treatment Plan?  No

## 2018-10-17 ENCOUNTER — HOSPITAL ENCOUNTER (OUTPATIENT)
Dept: BEHAVIORAL HEALTH | Facility: CLINIC | Age: 23
End: 2018-10-17
Attending: PSYCHIATRY & NEUROLOGY
Payer: COMMERCIAL

## 2018-10-17 PROCEDURE — H0035 MH PARTIAL HOSP TX UNDER 24H: HCPCS

## 2018-10-17 NOTE — PROGRESS NOTES
"Adult Mental Health Outpatient Group Therapy Progress Note        Client Initial Individualized Goals for Treatment: \"Stability,  Be able to focus. Increase self worth\".     See Initial Treatment suggestions for the client during the time between Diagnostic Assessment and completion of the Master Individualized Treatment Plan.     Initial Treatment suggestions for the client during the time between Diagnostic Assessment and completion of the Individualized Treatment Plan:  Follow Safety Plan   Ask for more information, support and/or assistance as needed.  Follow up with providers/community supports as needed:   Report increases or changes in symptoms to staff.  Report any personal safety concerns to staff.   Take medications as prescribed.  Report medication changes and/or side effects to staff.  Attend and participate in groups as scheduled or notify staff if unable to do so.  Report any use of substances to staff as this may impact your symptoms and/or perrsonal safety.  Notify staff if you have any other issues that need to be addressed. This may include any current abuse / neglect / exploitation or other vulnerability.  Follow recommendations of your treatment team and discuss concerns if not in agreement.      Area of Treatment Focus:  Symptom Management and Develop / Improve Independent Living Skills    Therapeutic Interventions/Treatment Strategies:  Support, Feedback, Safety Assessment, Structured Activity, Problem Solving and Education    Response to Treatment Strategies:  Accepted Feedback, Gave Feedback, Listened, Focused on Goals, Attentive, Accepted Support and Alert    Name of Group:  Group psychotherapy 1-2, Support Network Education 3-6    Group Participants:  5 of 6    Description and Outcome:  Participated in discussion of emotional regulation skills and utilizing these to create a lifestyle that provides for more emotional resilience and how these can be used when dealing with anxiety and " depression.    Is this a Weekly Review of the Progress on the Treatment Plan?  No

## 2018-10-17 NOTE — PROGRESS NOTES
"Adult Mental Health Partial Hospitalization Group Therapy Progress Note     Date: 10/16/18    Client Initial Individualized Goals for Treatment: \"Stability,  Be able to focus. Increase self worth\".     Treatment Goals:  1) Safety: Jazz will:    Notify staff when needing assistance to develop or implement a coping plan to manage suicidal or self injurious urges.    Use coping plan for safety, as needed.  2) Symptom Management: Jazz will:      Jazz will identify and use 2-3 skills for coping with anxiety.    Working on developing skills to manage and stabilize mood.     Work on developing self-compassion skills and boundaries.  3) In LIfe Skills Jazz will:     Learn, practice, apply 2 skills/strategies that support lifestyle balance/structure/routine with an emphasis on self compassion    Learn, practice, generalize 2 sensory or mindfulness based self regulation skills to decrease distress and improve participation in meaningful role, routines, and relationships.  4) Will develop an aftercare / transition plan by day of discharge    Area of Treatment Focus:  Symptom Management and Develop / Improve Independent Living Skills    Therapeutic Interventions/Treatment Strategies:  Support, Safety Assessments, Structured Activity, Problem Solving, Clarification and Education    Response to Treatment Strategies:  Accepted Feedback, Listened, Focused on Goals, Attentive, Accepted Support and Alert    Name of Group:  OT life skills: Goal integration  Time: 2:00-2:50  Group member attendance: 5 of 6     Description and Outcome: Jazz attended and participated in an structured life skills group where purposeful experiential intervention focuses on psychoeducation, exploration, practice, and generalizing taught independent living skills. Through the use of supportive social interactions, structured therapeutic and functional tasks in context, group members work towards stabilizing and managing mental health " symptoms for improved participation and function in valued roles, routines, relationships, and independent living.     Topic today is on emotional self-regulation with an emphasis on becoming aware of needs and employing sensory input/modalities for improved distress tolerance. Concepts covered included identifying/rating level of nervous system arousal followed by  experiential learning about internal and external sensory input that they personally find calming or alerting. Psychoeducation on two brief sensory based self regulation skills was provided: 5 finger grounding, and TIPP (temperature, intense exercise, paced breathing, paired muscle relaxation). Jazz is engaged and was able to identify sensory input she found calming and some she found uncomfortable as she begins her journey on understanding her sensory needs. Writer talked with Jazz about completing a Sensory Profile as a good way to identify her behavior patterns, preferences and work towards gaining insight into self soothing. Validation and support provided. Jazz would benefit from additional opportunities to practice the content to be able to generalize it to her everyday life with increased intentionality, consistency, and efficacy in support of her psychiatric recovery. She worked towards goal number 3 today.    Is this a Weekly Review of the Progress on the Treatment Plan?  No.

## 2018-10-18 ENCOUNTER — HOSPITAL ENCOUNTER (OUTPATIENT)
Dept: BEHAVIORAL HEALTH | Facility: CLINIC | Age: 23
End: 2018-10-18
Attending: PSYCHIATRY & NEUROLOGY
Payer: COMMERCIAL

## 2018-10-18 PROCEDURE — H0035 MH PARTIAL HOSP TX UNDER 24H: HCPCS

## 2018-10-18 NOTE — PROGRESS NOTES
"Adult Mental Health Partial Hospitalization Group Therapy Progress Note     Date: 10/17/18    Client Initial Individualized Goals for Treatment: \"Stability,  Be able to focus. Increase self worth\".     Treatment Goals:  1) Safety: Jazz will:    Notify staff when needing assistance to develop or implement a coping plan to manage suicidal or self injurious urges.    Use coping plan for safety, as needed.  2) Symptom Management: Jazz will:      Jazz will identify and use 2-3 skills for coping with anxiety.    Working on developing skills to manage and stabilize mood.     Work on developing self-compassion skills and boundaries.  3) In LIfe Skills Jazz will:     Learn, practice, apply 2 skills/strategies that support lifestyle balance/structure/routine with an emphasis on self compassion    Learn, practice, generalize 2 sensory or mindfulness based self regulation skills to decrease distress and improve participation in meaningful role, routines, and relationships.  4) Will develop an aftercare / transition plan by day of discharge    Area of Treatment Focus:  Symptom Management and Develop / Improve Independent Living Skills    Therapeutic Interventions/Treatment Strategies:  Support, Safety Assessments, Structured Activity, Problem Solving, Clarification and Education    Response to Treatment Strategies:  Accepted Feedback, Listened, Focused on Goals, Attentive, Accepted Support and Alert    Name of Group:  OT life skills clinic: mental health management  Time: 1:00-1:50  Group member attendance: 6 of 7     Description and Outcome: Jazz attended and participated in an structured life skills group where purposeful experiential intervention focuses on psychoeducation, exploration, practice, and generalizing taught independent living skills. Through the use of supportive social interactions, structured therapeutic and functional tasks in context, group members work towards stabilizing and managing " mental health symptoms for improved participation and function in valued roles, routines, relationships, and independent living.     Jazz  iniitates and works independently on her chosen semi structured engagement. She reports she finds it very enjoyable and soothing. She endorses that she is working on negative self talk and quieting it as she works.. Validation and support provided. Writer administered the Adult Sensory Profile based on observations and self report of Jazz. Will score and review results with her later in the week. Jazz would benefit from additional opportunities to practice the content to be able to generalize it to her everyday life with increased intentionality, consistency, and efficacy in support of her psychiatric recovery. She worked towards goal number 3 today.    Is this a Weekly Review of the Progress on the Treatment Plan?  Yes.      Are Treatment Plan Goals being addressed?  Yes, continue treatment goals      Are Treatment Plan Strategies to Address Goals Effective?  Yes, continue treatment strategies      Are there any current contracts in place?  No

## 2018-10-18 NOTE — PROGRESS NOTES
"Adult Mental Health Outpatient Group Therapy Progress Note     Client Initial Individualized Goals for Treatment: \"Stability,  Be able to focus. Increase self worth\".      Treatment Goals:  1) Safety: Jazz will:    Notify staff when needing assistance to develop or implement a coping plan to manage suicidal or self injurious urges.    Use coping plan for safety, as needed.  2) Symptom Management: Jazz will:      Jazz will identify and use 2-3 skills for coping with anxiety.    Working on developing skills to manage and stabilize mood.     Work on developing self-compassion skills and boundaries.  3) In LIfe Skills Jazz will:     Learn, practice, apply 2 skills/strategies that support lifestyle balance/structure/routine with an emphasis on self compassion    Learn, practice, generalize 2 sensory or mindfulness based self regulation skills to decrease distress and improve participation in meaningful role, routines, and relationships.  4) Will develop an aftercare / transition plan by day of discharge       Area of Treatment Focus:  Symptom Management, Personal Safety, Community Resources/Discharge Planning and Develop / Improve Independent Living Skills    Therapeutic Interventions/Treatment Strategies:  Support, Feedback, Safety Assessments, Structured Activity, Problem Solving and Education    Response to Treatment Strategies:  Accepted Feedback, Gave Feedback, Listened, Focused on Goals, Attentive, Accepted Support and Alert     Name of Group: Group Therapy Date/Time: 10/17/2018 / 0900 to 0950; Group Participants: 6 of 8, 3 known absences  Name of Group: Aftercare planning Date/Time: 10/17/2018 / [id number]; Group Participants: 5 of 8, 3 known absences      Description and Outcome:  Client reported improved mood today. She said she has been bothered by mood lability and so far the day was going better.  She said she had a good education session with her roommate.  She said her roommate noticed some " things about her she was unaware of for herself.  She said she felt validated by her noticing and caring.  She said they created a plan and sent it to some friends who are also willing to be supportive.  No SI reported.  Client verbalized understanding of session content by problem solving with group members and accepting feedback.  Aftercare Planning  Client participated in an aftercare planning.  Client completed an exercise to assess needs, plan for skills to address the identified need, and create an action plan to follow through on creating a daily schedule. Client worked with group members to get ideas and resources for assistance in identified areas.  Client demonstrated understanding of session content by creating a schedule for future use.    Is this a Weekly Review of the Progress on the Treatment Plan?  No

## 2018-10-18 NOTE — PROGRESS NOTES
"Adult Mental Health Outpatient Group Therapy Progress Note        Client Initial Individualized Goals for Treatment: \"Stability,  Be able to focus. Increase self worth\".     See Initial Treatment suggestions for the client during the time between Diagnostic Assessment and completion of the Master Individualized Treatment Plan.     Initial Treatment suggestions for the client during the time between Diagnostic Assessment and completion of the Individualized Treatment Plan:  Follow Safety Plan   Ask for more information, support and/or assistance as needed.  Follow up with providers/community supports as needed:   Report increases or changes in symptoms to staff.  Report any personal safety concerns to staff.   Take medications as prescribed.  Report medication changes and/or side effects to staff.  Attend and participate in groups as scheduled or notify staff if unable to do so.  Report any use of substances to staff as this may impact your symptoms and/or perrsonal safety.  Notify staff if you have any other issues that need to be addressed. This may include any current abuse / neglect / exploitation or other vulnerability.  Follow recommendations of your treatment team and discuss concerns if not in agreement.      Area of Treatment Focus:  Symptom Management and Develop / Improve Independent Living Skills    Therapeutic Interventions/Treatment Strategies:  Support, Feedback, Safety Assessment, Structured Activity, Problem Solving and Education    Response to Treatment Strategies:  Accepted Feedback, Gave Feedback, Listened, Focused on Goals, Attentive, Accepted Support and Alert    Name of Group:  Group psychotherapy 9-10, Interpersonal Relationships 10-11    Group Participants:  5 of 6; 6/6    Description and Outcome:  Jazz participated in daily mindfulness practice. Says she is \"rapid cycling\". Describes feeling happy/sociable and then suddenly feeling like she \"wanted to crawl out of my skin\" for about 30 " "minutes, followed by feeling \"super depressed and having urges to self-injure\" (says she avoided acting on those urges), then fell asleep by 8:30, only to wake up in a panic at 4:30. Says she has found a group on Facebook about bipolar disorder and this is helpful in feeling like she is not so crazy, that there are other people with similar experiences.     Discussed Jose M Campo's Relationship Grid, with self-esteem and boundaries dimensions intersecting to describe 4 kinds of relating styles in four quadrants, implications for problems in relationships as well as implications for working with self and other to create a healthier way of relating that allows for close/open connectedness while maintaining healthy boundaries and valuing both people's needs and desires as important. Discussed examples of how these relationships function and options for improving functioning.       Is this a Weekly Review of the Progress on the Treatment Plan?  No     "

## 2018-10-19 ENCOUNTER — HOSPITAL ENCOUNTER (OUTPATIENT)
Dept: BEHAVIORAL HEALTH | Facility: CLINIC | Age: 23
End: 2018-10-19
Attending: PSYCHIATRY & NEUROLOGY
Payer: COMMERCIAL

## 2018-10-19 PROCEDURE — H0035 MH PARTIAL HOSP TX UNDER 24H: HCPCS

## 2018-10-19 NOTE — PROGRESS NOTES
"Adult Mental Health Partial Hospitalization Group Therapy Progress Note     Date: 10/18/18    Client Initial Individualized Goals for Treatment: \"Stability,  Be able to focus. Increase self worth\".     Treatment Goals:  1) Safety: Jazz will:    Notify staff when needing assistance to develop or implement a coping plan to manage suicidal or self injurious urges.    Use coping plan for safety, as needed.  2) Symptom Management: Jazz will:      Jazz will identify and use 2-3 skills for coping with anxiety.    Working on developing skills to manage and stabilize mood.     Work on developing self-compassion skills and boundaries.  3) In LIfe Skills Jazz will:     Learn, practice, apply 2 skills/strategies that support lifestyle balance/structure/routine with an emphasis on self compassion    Learn, practice, generalize 2 sensory or mindfulness based self regulation skills to decrease distress and improve participation in meaningful role, routines, and relationships.  4) Will develop an aftercare / transition plan by day of discharge    Area of Treatment Focus:  Symptom Management and Develop / Improve Independent Living Skills    Therapeutic Interventions/Treatment Strategies:  Support, Safety Assessments, Structured Activity, Problem Solving, Clarification and Education    Response to Treatment Strategies:  Accepted Feedback, Listened, Focused on Goals, Attentive, Accepted Support and Alert    Name of Group:  OT life skills: stress management  Time: 1:00-1:50  Group member attendance: 6 of 6     Description and Outcome: Jazz attended and participated in an structured life skills group where purposeful experiential intervention focuses on psychoeducation, exploration, practice, and generalizing taught independent living skills. Through the use of supportive social interactions, structured therapeutic and functional tasks in context, group members work towards stabilizing and managing mental health " symptoms for improved participation and function in valued roles, routines, relationships, and independent living.     Provided group with verbal and written psychoeducation material focused on lifestyle balance with an emphasis on leisure values in order to support stress management. Group members identified their top leisure values (how they want to spend their energy and time in a way that is restorative and rejuvinating). Neuroscience around parasympathetic nervous system activation was provided in context of leisure activity. They also considered their past leisure choices, current ones, and possibilities for the future including initial steps and problem solving barriers. Jazz chooses to engage in a leisure activity that is new to her and reports she enjoys the creative expression aspect of the activity. Jazz would benefit from additional opportunities to practice the content to be able to generalize it to her everyday life with increased intentionality, consistency, and efficacy in support of her psychiatric recovery. She worked towards goal number 3 today.    Is this a Weekly Review of the Progress on the Treatment Plan?  No.

## 2018-10-19 NOTE — PROGRESS NOTES
[facility]   Dr. Combs's Psychiatric Progress Note  10/19/2018      Patient:  Jazz Nuñez   Medical Record Number:  [id number]  :  1995          Interim History:   The patient's care was discussed with the treatment team and chart notes were reviewed.  Week was ok with ups and downs.  Rapidly shifting mood.  Gets high for a few hours and then down for a few.  Most of the time mood is good when at PHP.  Night is more depressed.  Mornings are more anxious.  OCD is not prevalent now.  This weekend has friends visiting from out of town.      Psychiatric ROS:  Mood:   High moods and low moods and anxiety;    Sleep:  Some middle insomnia;  Some nightmares;  Gets 7 hours/night  Appetite:  low  Eating:normal  Energy Level: up and down with mood  Concentration/Memory Problems:  scattered  Suicidal Thoughts:No  Homicidal Thoughts:No  Psychotic Symptoms: No  Medication Side Effects:No  Medication Compliance:Yes   Physical Complaints:negative         Medications:     PAST PSYCH MEDS:  Prozac, Wellbutrin, Effexor, lithium, Zoloft, Ativan, Paxil, Celexa, Lexapro, BuSpar, Xanax, Valium, Lamictal,     Current Outpatient Prescriptions   Medication Sig          Gabapetin 400mg Take 1 po tid      lamoTRIgine (LAMICTAL) 25 MG tablet Take 1 po daily x 1 week then increase every week by 25mg until reaching target dose of 200 mg/d     Multiple Vitamin (MULTI-VITAMINS) TABS Take 1 tablet by mouth daily     venlafaxine (EFFEXOR-XR) 150 and 75 MG 24 hr capsule 225 mg daily with food     No current facility-administered medications for this encounter.              Allergies:   Not on File         Psychiatric Examination:   There were no vitals taken for this visit.  Weight is 0 lbs 0 oz  There is no height or weight on file to calculate BMI.    Appearance:  awake, alert and adequately groomed  Attitude:  cooperative  Eye Contact:  looking down a lot  Mood:    Up and down;  anxious  Affect:   brighter than what she reports;  mood incongruent;    Speech:  clear, coherent  Psychomotor Behavior:  no evidence of tardive dyskinesia, dystonia, or tics  Throught Process:  logical  Associations:  no loose associations  Thought Content:  no evidence of suicidal ideation or homicidal ideation and no evidence of psychotic thought  Insight:  good  Judgement:  intact  Oriented to:  time, person, and place  Attention Span and Concentration:  intact  Recent and Remote Memory:  intact  Gait:Normal    Risk/Potential for Dangerousness:  Multiple Active Diagnoses:HIGH  Self Care:LOW  Suicide:LOW  Assault:LOW  Self Injurious Behaviors:LOW  Inappropriate Sexual Behavior:LOW         Labs:   No results found for this or any previous visit (from the past 24 hour(s)).     No results found for this or any previous visit (from the past 1008 hour(s)).      Impression:   This is a 23 year old female continues PHP for mood stabilization.  Mood is still labile.           DIagnoses:     Axis I:  Bipolar II disorder, depressed. Rule out bipolar 1 disorder, depressed.  History of obsessive-compulsive disorder.  Rule out alcohol use disorder.   Axis II:  Rule out personality disorder, not otherwise specified.   Axis III:  No diagnosis.            Plan:     Continue [facility].   Continued Effexor, but discontinued Wellbutrin.   Started Lamictal 25 mg daily and titrate weekly by 25 mg until reaching target dosage of 200 mg daily.   Continue gabapentin for now.   Ms. Nuñez plans to follow up with her current therapist and get a new psychiatrist at her therapist's office on [address] in [place].     Allan Combs MD

## 2018-10-19 NOTE — PROGRESS NOTES
"Adult Mental Health Outpatient Group Therapy Progress Note     Client Initial Individualized Goals for Treatment: \"Stability,  Be able to focus. Increase self worth\".      Treatment Goals:  1) Safety: Jazz will:    Notify staff when needing assistance to develop or implement a coping plan to manage suicidal or self injurious urges.    Use coping plan for safety, as needed.  2) Symptom Management: Jazz will:      Jazz will identify and use 2-3 skills for coping with anxiety.    Working on developing skills to manage and stabilize mood.     Work on developing self-compassion skills and boundaries.  3) In LIfe Skills Jazz will:     Learn, practice, apply 2 skills/strategies that support lifestyle balance/structure/routine with an emphasis on self compassion    Learn, practice, generalize 2 sensory or mindfulness based self regulation skills to decrease distress and improve participation in meaningful role, routines, and relationships.  4) Will develop an aftercare / transition plan by day of discharge       Area of Treatment Focus:  Symptom Management, Personal Safety, Develop / Improve Independent Living Skills    Therapeutic Interventions/Treatment Strategies:  Support, Feedback, Safety Assessments, Structured Activity, Problem Solving and Education    Response to Treatment Strategies:  Accepted Feedback, Gave Feedback, Listened, Focused on Goals, Attentive, Accepted Support and Alert     Name of Group: Group Therapy Date/Time: 10/19/2018 / 0900 to 0950; Group Participants: Name of Group: Self-Awareness Date/Time: 10/19/2018 / [id number]; Group Participants:     Description and Outcome:  Group therapy  Client reported having a better night than the previous night. She said she was able to leave her apartment and go to [employer] without trouble. She said she had nightmares which is atypical for her, but was able to manage through them.  She said she worries about when she will crash.  Discussed focusing on " the moment and evaluating situations as they arise to decrease future forecasting.  No SI reported.  Client verbalized understanding of session content by problem solving with group members and accepting feedback.  Self-Awareness  Client participated in an education session on guilt and shame.  Guilt and shame were defined and examples were discussed.  The process for managing guilt and shame were presented and ways to challenge thinking to evaluate guilt and make decisions based on current behavior and needs.  Discussed managing reactions of others and creating avenues for change.  Client would benefit from additional opportunities to practice and implement content from this session.    Is this a Weekly Review of the Progress on the Treatment Plan?  Yes.       Are Treatment Plan Goals being addressed?  Yes, continue treatment goals        Are Treatment Plan Strategies to Address Goals Effective?  Yes, continue treatment strategies        Are there any current contracts in place?  No

## 2018-10-19 NOTE — PROGRESS NOTES
"  Adult Mental Health Outpatient Group Therapy Progress Note     Client Initial Individualized Goals for Treatment: \"Stability,  Be able to focus. Increase self worth\".      Treatment Goals:  1) Safety: Jazz will:    Notify staff when needing assistance to develop or implement a coping plan to manage suicidal or self injurious urges.    Use coping plan for safety, as needed.  2) Symptom Management: Jazz will:      Jazz will identify and use 2-3 skills for coping with anxiety.    Working on developing skills to manage and stabilize mood.     Work on developing self-compassion skills and boundaries.  3) In LIfe Skills Jazz will:     Learn, practice, apply 2 skills/strategies that support lifestyle balance/structure/routine with an emphasis on self compassion    Learn, practice, generalize 2 sensory or mindfulness based self regulation skills to decrease distress and improve participation in meaningful role, routines, and relationships.  4) Will develop an aftercare / transition plan by day of discharge     Area of Treatment Focus:  Symptom Management and Develop Socialization / Interpersonal Relationship Skills    Therapeutic Interventions/Treatment Strategies:  Support, Feedback, Structured Activity and Education    Response to Treatment Strategies:  Accepted Feedback, Gave Feedback, Listened, Focused on Goals, Attentive, Accepted Support and Alert    Name of Group:  Relax and review 200-250, 5 of 6 participants     Description and Outcome:  Discussed importance of intentional physical relaxation practice for management of anxiety and as a way to reduce stress to help with depression.  The group was offered a guided imagery exercise and were also invited to share an accomplishment from this week.  Client demonstrated understanding of session by participating in relaxation exercise and sharing accomplishment with the group. Jazz shared that she was proud of being 10 days sober.    Is this a Weekly " Review of the Progress on the Treatment Plan?  No

## 2018-10-19 NOTE — PROGRESS NOTES
"  Adult Mental Health Outpatient Group Therapy Progress Note     Client Initial Individualized Goals for Treatment: \"Stability,  Be able to focus. Increase self worth\".      See Initial Treatment suggestions for the client during the time between Diagnostic Assessment and completion of the Master Individualized Treatment Plan.      Initial Treatment suggestions for the client during the time between Diagnostic Assessment and completion of the Individualized Treatment Plan:  Follow Safety Plan   Ask for more information, support and/or assistance as needed.  Follow up with providers/community supports as needed:   Report increases or changes in symptoms to staff.  Report any personal safety concerns to staff.   Take medications as prescribed.  Report medication changes and/or side effects to staff.  Attend and participate in groups as scheduled or notify staff if unable to do so.  Report any use of substances to staff as this may impact your symptoms and/or perrsonal safety.  Notify staff if you have any other issues that need to be addressed. This may include any current abuse / neglect / exploitation or other vulnerability.  Follow recommendations of your treatment team and discuss concerns if not in agreement.        Area of Treatment Focus:  Symptom Management and Develop Socialization / Interpersonal Relationship Skills    Therapeutic Interventions/Treatment Strategies:  Support, Feedback, Structured Activity, Clarification and Education    Response to Treatment Strategies:  Accepted Feedback, Gave Feedback, Listened, Focused on Goals, Attentive, Accepted Support and Alert    Name of Group:  Self awareness 200-250, 6 of 6 participants     Description and Outcome:  The group was provided with a guided breathing and warmup structure with focus on increasing self awareness and on providing an embodied experience.  Discussion included the importance of listening to body cues as a way of identifying emotion as well " "as accompanying needs and wants, and as a way of practising self compassion, authenticity, mindfulness, self expression,  and connection to other.  Group members were introduced to Marissa movement analysis as a tool to \"move with intention\" and thereby practising mindfulness.  Client demonstrated understanding of session by participating in experiential and sharing experience with other. Jazz was engaged.    Is this a Weekly Review of the Progress on the Treatment Plan?  No        "

## 2018-10-22 ENCOUNTER — HOSPITAL ENCOUNTER (OUTPATIENT)
Dept: BEHAVIORAL HEALTH | Facility: CLINIC | Age: 23
End: 2018-10-22
Attending: PSYCHIATRY & NEUROLOGY
Payer: COMMERCIAL

## 2018-10-22 PROCEDURE — H0035 MH PARTIAL HOSP TX UNDER 24H: HCPCS

## 2018-10-22 NOTE — PROGRESS NOTES
[facility]   Dr. Combs's Psychiatric Progress Note  10/22/2018      Patient:  Jazz Nuñez   Medical Record Number:  [id number]  :  1995          Interim History:   The patient's care was discussed with the treatment team and chart notes were reviewed.  Had visitors from out of town.  That was good but caused some anxiety, which gets better with deep breathing.  Had extreme anger/irritability.  Dad texted her.  She wants no contact with him, and still contacts her.  No ETOH x 13 days.      Psychiatric ROS:  Mood:   Depressed, angry, not anxious;    Sleep:  Stayed up too late this weekend 4 AM;  Went to bed earlier last night;    Appetite:  average  Eating:normal  Energy Level:   Up or down depending on mood;    Concentration/Memory Problems: spaces off in group;    Suicidal Thoughts:No  Homicidal Thoughts:No  Psychotic Symptoms: No  Medication Side Effects:No  Medication Compliance:Yes   Physical Complaints:negative         Medications:     PAST PSYCH MEDS:  Prozac, Wellbutrin, Effexor, lithium, Zoloft, Ativan, Paxil, Celexa, Lexapro, BuSpar, Xanax, Valium, Lamictal,     Current Outpatient Prescriptions   Medication Sig          Gabapetin 400mg Take 1 po tid      lamoTRIgine (LAMICTAL) 25 MG tablet Take 2 po daily x 1 week then increase every week by 25mg until reaching target dose of 200 mg/d (increases on Sundays)     Multiple Vitamin (MULTI-VITAMINS) TABS Take 1 tablet by mouth daily     venlafaxine (EFFEXOR-XR) 150 and 75 MG 24 hr capsule 225 mg daily with food     No current facility-administered medications for this encounter.              Allergies:   Not on File         Psychiatric Examination:   There were no vitals taken for this visit.  Weight is 0 lbs 0 oz  There is no height or weight on file to calculate BMI.    Appearance:  awake, alert and adequately groomed  Attitude:  cooperative  Eye Contact:  looking down a lot  Mood:    Up and down;   anxious  Affect:  brighter than what she reports;  mood incongruent;    Speech:  clear, coherent  Psychomotor Behavior:  no evidence of tardive dyskinesia, dystonia, or tics  Throught Process:  logical  Associations:  no loose associations  Thought Content:  no evidence of suicidal ideation or homicidal ideation and no evidence of psychotic thought  Insight:  good  Judgement:  intact  Oriented to:  time, person, and place  Attention Span and Concentration:  intact  Recent and Remote Memory:  intact  Gait:Normal    Risk/Potential for Dangerousness:  Multiple Active Diagnoses:HIGH  Self Care:LOW  Suicide:LOW  Assault:LOW  Self Injurious Behaviors:LOW  Inappropriate Sexual Behavior:LOW         Labs:   No results found for this or any previous visit (from the past 24 hour(s)).     No results found for this or any previous visit (from the past 1008 hour(s)).      Impression:   This is a 23 year old female continues PHP for mood stabilization.  Mood is still labile.  Groups are helpful.           DIagnoses:     Axis I:  Bipolar II disorder, depressed. Rule out bipolar 1 disorder, depressed.  History of obsessive-compulsive disorder.  Rule out alcohol use disorder.   Axis II:  Rule out personality disorder, not otherwise specified.   Axis III:  No diagnosis.            Plan:     Continue [facility].   Continued Effexor, but discontinued Wellbutrin.   Started Lamictal 25 mg daily and titrate weekly by 25 mg until reaching target dosage of 200 mg daily.   Continue gabapentin for now.   Ms. Nuñez plans to follow up with her current therapist and get a new psychiatrist at her therapist's office on [address] in [place].   Plans to get into DBT group.      Allan Combs MD

## 2018-10-22 NOTE — PROGRESS NOTES
"Adult Mental Health Outpatient Group Therapy Progress Note     Client Initial Individualized Goals for Treatment: \"Stability,  Be able to focus. Increase self worth\".      Treatment Goals:  1) Safety: Jazz will:    Notify staff when needing assistance to develop or implement a coping plan to manage suicidal or self injurious urges.    Use coping plan for safety, as needed.  2) Symptom Management: Jazz will:      Jazz will identify and use 2-3 skills for coping with anxiety.    Working on developing skills to manage and stabilize mood.     Work on developing self-compassion skills and boundaries.  3) In LIfe Skills Jazz will:     Learn, practice, apply 2 skills/strategies that support lifestyle balance/structure/routine with an emphasis on self compassion    Learn, practice, generalize 2 sensory or mindfulness based self regulation skills to decrease distress and improve participation in meaningful role, routines, and relationships.  4) Will develop an aftercare / transition plan by day of discharge           Area of Treatment Focus:  Symptom Management, Personal Safety, Develop / Improve Independent Living Skills    Therapeutic Interventions/Treatment Strategies:  Support, Feedback, Safety Assessments, Structured Activity, Problem Solving and Education    Response to Treatment Strategies:  Accepted Feedback, Gave Feedback, Listened, Focused on Goals, Attentive, Accepted Support and Alert     Name of Group: Group Therapy Date/Time: 10/22/2018 / 0900 to 0950; Group Participants: 6 of 6  Name of Group: Anxiety Lab Date/Time: 10/22/2018 / [id number]; Group Participants: 6 of 6  Name of Group: Self-talk  Date/Time: 10/22/2018 / [id number]; Group Participants: 6 of 6    Description and Outcome:  Group therapy  Client reported she had a mixed weekend.  She said her friends came to visit and that was helpful.  She said they had good talks and she was able to manage the drama.  She said she got a text from her " dad that ruined her weekend.  She said she is angry with him and he has said mean things to her in a letter and about her to his family.  She said she is told she should forgive him but she does not want to.  Group suggested she does not have to forgive him and she may want to forgive herself so she can take her energy away from the anger.   No SI reported.  Client verbalized understanding of session content by problem solving with group members and accepting feedback.  Anxiety Lab  Client participated in an education group on managing physical symptoms of anxiety.  Brain and body system communication was presented and explained in the context of panic.  Skills to regulate body systems and manage emotions and thinking were presented and discussed in group.  Client worked with group members to better understand the use of self-soothing skills to decrease the intensity of anxiety symptoms. Client demonstrated understanding of session content by asking for clarification of information, generating skills, and working examples with others.  Self-Talk  Client participated in an education session on cognitive distortions. The effect of emotion on thoughts was presented and the benefits of calming before acting on thoughts was discussed.  Attribution theory was presented as a mechanism by which distortions can be generated.  Discussed learning theory related to associative networks and reinforcement for constriction in thinking.  Types of distortions were presented and the client identified common distortions she uses to explain events and predict outcomes.  Client worked on an example of all/nothing thinking with the group to generate ways to think more flexibly and decrease symptoms distress. Client would benefit from additional opportunities to practice and implement content from this session.  Is this a Weekly Review of the Progress on the Treatment Plan?  No

## 2018-10-23 ENCOUNTER — HOSPITAL ENCOUNTER (OUTPATIENT)
Dept: BEHAVIORAL HEALTH | Facility: CLINIC | Age: 23
End: 2018-10-23
Attending: PSYCHIATRY & NEUROLOGY
Payer: COMMERCIAL

## 2018-10-23 PROCEDURE — H0035 MH PARTIAL HOSP TX UNDER 24H: HCPCS

## 2018-10-23 NOTE — PROGRESS NOTES
"Adult Mental Health Outpatient Group Therapy Progress Note     Client Initial Individualized Goals for Treatment: \"Stability,  Be able to focus. Increase self worth\".      Treatment Goals:  1) Safety: Jazz will:    Notify staff when needing assistance to develop or implement a coping plan to manage suicidal or self injurious urges.    Use coping plan for safety, as needed.  2) Symptom Management: Jazz will:      Jazz will identify and use 2-3 skills for coping with anxiety.    Working on developing skills to manage and stabilize mood.     Work on developing self-compassion skills and boundaries.  3) In LIfe Skills Jazz will:     Learn, practice, apply 2 skills/strategies that support lifestyle balance/structure/routine with an emphasis on self compassion    Learn, practice, generalize 2 sensory or mindfulness based self regulation skills to decrease distress and improve participation in meaningful role, routines, and relationships.  4) Will develop an aftercare / transition plan by day of discharge         Area of Treatment Focus:  Symptom Management, Personal Safety, Develop / Improve Independent Living Skills    Therapeutic Interventions/Treatment Strategies:  Support, Feedback, Safety Assessments, Structured Activity, Problem Solving and Education    Response to Treatment Strategies:  Accepted Feedback, Gave Feedback, Listened, Focused on Goals, Attentive, Accepted Support and Alert     Name of Group: Group Therapy Date/Time: 10/23/2018 / 0900 to 0950; Group Participants: 6 of 6  Name of Group: Discharge Planning Date/Time: 10/23/2018 / [id number]; Group Participants: 6 of 6  Name of Group: Mindfulness  Date/Time: 10/23/2018 / [id number]; Group Participants: 5 of 6, one absence due to appt.    Description and Outcome:  Group therapy  Client reported low energy today. She said she stayed up late because she was reading.  She said she either has no focus or gets hyperfocused to her own detriment.  " Discussed using timers etc to limit hyperfocus.  She said she is struggling to eat healthy because she has not appetite.  Group discussed simple ways to get protein.  No SI reported. Client verbalized understanding of session content by problem solving with group members and accepting feedback.  Aftercare planning  Client participated in an aftercare planning.  Client completed an exercise to assess needs, plan for skills to address the identified need, and create an action plan to follow through scheduling an intake for DBT. Client worked with group members to get ideas and resources for assistance in identified areas.  Client demonstrated understanding of session content by creating a schedule for future use.  Mindfulness  Client participated in an exercise on mindfulness.  The goals of mindfulness, what skills and how skills were presented and discussed related to emotions management of depression and anxiety.  Examples were presented and client practiced skills learned with group members.   Client provided feedback to the group.  Client would benefit from additional opportunities to practice and implement content from this session.      Is this a Weekly Review of the Progress on the Treatment Plan?  No

## 2018-10-23 NOTE — PROGRESS NOTES
"  Adult Mental Health Outpatient Group Therapy Progress Note     Client Initial Individualized Goals for Treatment: \"Stability,  Be able to focus. Increase self worth\".      Treatment Goals:  1) Safety: Jazz will:    Notify staff when needing assistance to develop or implement a coping plan to manage suicidal or self injurious urges.    Use coping plan for safety, as needed.  2) Symptom Management: Jazz will:      Jazz will identify and use 2-3 skills for coping with anxiety.    Working on developing skills to manage and stabilize mood.     Work on developing self-compassion skills and boundaries.  3) In LIfe Skills Jazz will:     Learn, practice, apply 2 skills/strategies that support lifestyle balance/structure/routine with an emphasis on self compassion    Learn, practice, generalize 2 sensory or mindfulness based self regulation skills to decrease distress and improve participation in meaningful role, routines, and relationships.  4) Will develop an aftercare / transition plan by day of discharge       Area of Treatment Focus:  Symptom Management, Develop Socialization / Interpersonal Relationship Skills and Cultural / Spirituality    Therapeutic Interventions/Treatment Strategies:  Support, Feedback, Clarification and Education    Response to Treatment Strategies:  Accepted Feedback, Gave Feedback, Listened, Focused on Goals, Attentive, Accepted Support and Alert    Name of Group:  Loss and Grief, [id number], 6 of 6 participants      Description and Outcome:  Group was lead by nils Carlson. Poems and discussion were used to facilitate the conversation of grief and loss. The group was encouraged to share and process their feelings of loss with a grief diagram. Themes discussed: recognizing grief as a feeling, accepting grief as our own, trusting grief to lead us to hope and remembering to show compassion to ourselves when we face grief. Jazz demonstrated understanding of session by sharing " her experience of shock when hearing of father's cancer diagnosis.    Is this a Weekly Review of the Progress on the Treatment Plan?  No

## 2018-10-24 ENCOUNTER — HOSPITAL ENCOUNTER (OUTPATIENT)
Dept: BEHAVIORAL HEALTH | Facility: CLINIC | Age: 23
End: 2018-10-24
Attending: PSYCHIATRY & NEUROLOGY
Payer: COMMERCIAL

## 2018-10-24 PROCEDURE — H0035 MH PARTIAL HOSP TX UNDER 24H: HCPCS

## 2018-10-24 NOTE — PROGRESS NOTES
"Adult Mental Health Partial Hospitalization Group Therapy Progress Note     Date: 10/19/18    Client Initial Individualized Goals for Treatment: \"Stability,  Be able to focus. Increase self worth\".     Treatment Goals:  1) Safety: Jazz will:    Notify staff when needing assistance to develop or implement a coping plan to manage suicidal or self injurious urges.    Use coping plan for safety, as needed.  2) Symptom Management: Jazz will:      Jazz will identify and use 2-3 skills for coping with anxiety.    Working on developing skills to manage and stabilize mood.     Work on developing self-compassion skills and boundaries.  3) In LIfe Skills Jazz will:     Learn, practice, apply 2 skills/strategies that support lifestyle balance/structure/routine with an emphasis on self compassion    Learn, practice, generalize 2 sensory or mindfulness based self regulation skills to decrease distress and improve participation in meaningful role, routines, and relationships.  4) Will develop an aftercare / transition plan by day of discharge    Area of Treatment Focus:  Symptom Management and Develop / Improve Independent Living Skills    Therapeutic Interventions/Treatment Strategies:  Support, Safety Assessments, Structured Activity, Problem Solving, Clarification and Education    Response to Treatment Strategies:  Accepted Feedback, Listened, Focused on Goals, Attentive, Accepted Support and Alert    Name of Group:  OT life skills: stress management  Time: 1:00-1:50  Group member attendance: 6 of 6     Description and Outcome: Jazz attended and participated in an structured life skills group where purposeful experiential intervention focuses on psychoeducation, exploration, practice, and generalizing taught independent living skills. Through the use of supportive social interactions, structured therapeutic and functional tasks in context, group members work towards stabilizing and managing mental health " symptoms for improved participation and function in valued roles, routines, relationships, and independent living.     Provided group with verbal and written psychoeducation material focused on lifestyle balance skills that support wellness and reduce stress. Jazz focuses on creating a self care routine that helps her reduce impulsive behaviors. Writer reviewed the results of her Sensory Profile with her and provided her with specific sensory based interventions and considerations to help her improve her self care. Validation and support provided.. Jazz would benefit from additional opportunities to practice the content to be able to generalize it to her everyday life with increased intentionality, consistency, and efficacy in support of her psychiatric recovery. She worked towards goal number 3 today.    Is this a Weekly Review of the Progress on the Treatment Plan?  No.

## 2018-10-24 NOTE — PROGRESS NOTES
"Adult Mental Health Partial Hospitalization Group Therapy Progress Note     Date: 10/22/18    Client Initial Individualized Goals for Treatment: \"Stability,  Be able to focus. Increase self worth\".     Treatment Goals:  1) Safety: Jazz will:    Notify staff when needing assistance to develop or implement a coping plan to manage suicidal or self injurious urges.    Use coping plan for safety, as needed.  2) Symptom Management: Jazz will:      Jazz will identify and use 2-3 skills for coping with anxiety.    Working on developing skills to manage and stabilize mood.     Work on developing self-compassion skills and boundaries.  3) In LIfe Skills Jazz will:     Learn, practice, apply 2 skills/strategies that support lifestyle balance/structure/routine with an emphasis on self compassion    Learn, practice, generalize 2 sensory or mindfulness based self regulation skills to decrease distress and improve participation in meaningful role, routines, and relationships.  4) Will develop an aftercare / transition plan by day of discharge    Area of Treatment Focus:  Symptom Management and Develop / Improve Independent Living Skills    Therapeutic Interventions/Treatment Strategies:  Support, Safety Assessments, Structured Activity, Problem Solving, Clarification and Education    Response to Treatment Strategies:  Accepted Feedback, Listened, Focused on Goals, Attentive, Accepted Support and Alert    Name of Group:  OT life skills: goal integration  Time: 1:00-1:50  Group member attendance: 6 of 6     Description and Outcome: To support progress towards treatment goals and psychiatric recovery, group members were led through a structured process to integrate new learning, skills, and emerging self-awareness into their daily and weekly life. The process includes:   1. Brief psychoeducation on evidence around the neuro-science of change with regards to setting small achievable goals.  2. Brief education on " components of self-compassion in context of setting goals provided.  3. Brief psychoeducation and practice of 2 positive psychology skills: GLAD and 3 good things.   4. Write smart goals for the next week in 3 of the following areas: self-compassion, mindfulness, connections, wellness, lifestyle balance, discharge planning, and coping skills.   5. Integrate the goals into their life using a provided weekly planner. Encouraged to highlight the highlights as the week progresses.  6. Sharing intentions with the group for accountability, and to also receive support from peers as the week progresses  Jazz presents with calm even engaged affect today. She was able to set the following 3 SMART goals for the week in support of his psychiatric recovery:   1) Mindfulness: Do a GLAD 3x this week.  2) Wellness: Physical activity 2x for 45 minutes this week.   3) Lifestyle balance: get to bed by 10:30 pm 3x this week.     Validation and support provided. Jazz would benefit from additional opportunities to practice the content to be able to generalize it to her everyday life with increased intentionality, consistency, and efficacy in support of her psychiatric recovery. She worked towards goal number 3 today.    Is this a Weekly Review of the Progress on the Treatment Plan?  No.

## 2018-10-24 NOTE — PROGRESS NOTES
"  Adult Mental Health Outpatient Group Therapy Progress Note     Client Initial Individualized Goals for Treatment: \"Stability,  Be able to focus. Increase self worth\".      Treatment Goals:  1) Safety: Jazz will:    Notify staff when needing assistance to develop or implement a coping plan to manage suicidal or self injurious urges.    Use coping plan for safety, as needed.  2) Symptom Management: Jazz will:      Jazz will identify and use 2-3 skills for coping with anxiety.    Working on developing skills to manage and stabilize mood.     Work on developing self-compassion skills and boundaries.  3) In LIfe Skills Jazz will:     Learn, practice, apply 2 skills/strategies that support lifestyle balance/structure/routine with an emphasis on self compassion    Learn, practice, generalize 2 sensory or mindfulness based self regulation skills to decrease distress and improve participation in meaningful role, routines, and relationships.  4) Will develop an aftercare / transition plan by day of discharge     Area of Treatment Focus:  Symptom Management and Develop Socialization / Interpersonal Relationship Skills    Therapeutic Interventions/Treatment Strategies:  Support, Feedback, Structured Activity, Clarification and Education    Response to Treatment Strategies:  Accepted Feedback, Gave Feedback, Listened, Focused on Goals, Attentive, Accepted Support and Alert    Name of Group:   Mental  Health management [id number], 4 of 5 participants      Description and Outcome:  The group was given a structured journaling worksheet with the focus on feeling identification, expression and containment.  Information on the purpose and benefits of structured journaling was discussed.  Group members were offered the opportunity to share part, all, or none of their journaling and were encouraged to comment on process. Jazz demonstrated understanding of session by completing worksheet.  She chose to work with " feeling of guilt and shared that this structure was helpful.    Is this a Weekly Review of the Progress on the Treatment Plan?  No

## 2018-10-24 NOTE — PROGRESS NOTES
[facility]   Dr. Combs's Psychiatric Progress Note  10/24/2018      Patient:  Jazz Nuñez   Medical Record Number:  [id number]  :  1995          Interim History:   The patient's care was discussed with the treatment team and chart notes were reviewed.  Really good mood.  Stable.  She's had some acid reflux.  Greek yoghurt helps.  Groups are going well.  Finishes PHP tomorrow.      Psychiatric ROS:  Mood:   good  Sleep:   good  Appetite:  good  Eating:normal  Energy Level:   Hard to get going but then ok;    Concentration/Memory: difficult  Suicidal Thoughts:No  Homicidal Thoughts:No  Psychotic Symptoms: No  Medication Side Effects:No  Medication Compliance:Yes   Physical Complaints:  Acid reflux, random spotting,          Medications:     PAST PSYCH MEDS:  Prozac, Wellbutrin, Effexor, lithium, Zoloft, Ativan, Paxil, Celexa, Lexapro, BuSpar, Xanax, Valium, Lamictal,     Current Outpatient Prescriptions   Medication Sig          Gabapetin 400mg Take 1 po tid      lamoTRIgine (LAMICTAL) 25 MG tablet Take 2 po daily x 1 week then increase every week by 25mg until reaching target dose of 200 mg/d (increases on Sundays)     Multiple Vitamin (MULTI-VITAMINS) TABS Take 1 tablet by mouth daily     venlafaxine (EFFEXOR-XR) 150 and 75 MG 24 hr capsule 225 mg daily with food     No current facility-administered medications for this encounter.              Allergies:   Not on File         Psychiatric Examination:   There were no vitals taken for this visit.  Weight is 0 lbs 0 oz  There is no height or weight on file to calculate BMI.    Appearance:  awake, alert and adequately groomed  Attitude:  cooperative  Eye Contact:  looking down a lot  Mood:    good  Affect:  bright  Speech:  clear, coherent  Psychomotor Behavior:  no evidence of tardive dyskinesia, dystonia, or tics  Throught Process:  logical  Associations:  no loose associations  Thought Content:  no evidence of suicidal  ideation or homicidal ideation and no evidence of psychotic thought  Insight:  good  Judgement:  intact  Oriented to:  time, person, and place  Attention Span and Concentration:  intact  Recent and Remote Memory:  intact  Gait:Normal    Risk/Potential for Dangerousness:  Multiple Active Diagnoses:HIGH  Self Care:LOW  Suicide:LOW  Assault:LOW  Self Injurious Behaviors:LOW  Inappropriate Sexual Behavior:LOW         Labs:   No results found for this or any previous visit (from the past 24 hour(s)).     No results found for this or any previous visit (from the past 1008 hour(s)).      Impression:   This is a 23 year old female continues PHP for mood stabilization.  Mood is good.  Completes PHP tomorrow.             DIagnoses:     Axis I:  Bipolar II disorder, depressed. Rule out bipolar 1 disorder, depressed.  History of obsessive-compulsive disorder.  Rule out alcohol use disorder.   Axis II:  Rule out personality disorder, not otherwise specified.   Axis III:  No diagnosis.            Plan:     Continue [facility].  Completes this tomorrow.    Continued Effexor, but discontinued Wellbutrin.   Started Lamictal 25 mg daily and titrate weekly by 25 mg until reaching target dosage of 200 mg daily.   Continue gabapentin for now.   Ms. Nuñez plans to follow up with her current therapist and get a new psychiatrist at her therapist's office on [address] in [place].   Plans to get into DBT group.  She's working with her current therapist on that.      Allan Combs MD

## 2018-10-25 ENCOUNTER — HOSPITAL ENCOUNTER (OUTPATIENT)
Dept: BEHAVIORAL HEALTH | Facility: CLINIC | Age: 23
End: 2018-10-25
Attending: PSYCHIATRY & NEUROLOGY
Payer: COMMERCIAL

## 2018-10-25 PROCEDURE — H0035 MH PARTIAL HOSP TX UNDER 24H: HCPCS

## 2018-10-25 ASSESSMENT — ANXIETY QUESTIONNAIRES
6. BECOMING EASILY ANNOYED OR IRRITABLE: MORE THAN HALF THE DAYS
GAD7 TOTAL SCORE: 8
7. FEELING AFRAID AS IF SOMETHING AWFUL MIGHT HAPPEN: NOT AT ALL
5. BEING SO RESTLESS THAT IT IS HARD TO SIT STILL: SEVERAL DAYS
IF YOU CHECKED OFF ANY PROBLEMS ON THIS QUESTIONNAIRE, HOW DIFFICULT HAVE THESE PROBLEMS MADE IT FOR YOU TO DO YOUR WORK, TAKE CARE OF THINGS AT HOME, OR GET ALONG WITH OTHER PEOPLE: SOMEWHAT DIFFICULT
1. FEELING NERVOUS, ANXIOUS, OR ON EDGE: MORE THAN HALF THE DAYS
3. WORRYING TOO MUCH ABOUT DIFFERENT THINGS: SEVERAL DAYS
2. NOT BEING ABLE TO STOP OR CONTROL WORRYING: SEVERAL DAYS

## 2018-10-25 ASSESSMENT — PATIENT HEALTH QUESTIONNAIRE - PHQ9
SUM OF ALL RESPONSES TO PHQ QUESTIONS 1-9: 14
5. POOR APPETITE OR OVEREATING: SEVERAL DAYS

## 2018-10-25 NOTE — PROGRESS NOTES
"  Adult Mental Health Outpatient Group Therapy Progress Note     Client Initial Individualized Goals for Treatment: \"Stability,  Be able to focus. Increase self worth\".      Treatment Goals:  1) Safety: Jazz will:    Notify staff when needing assistance to develop or implement a coping plan to manage suicidal or self injurious urges.    Use coping plan for safety, as needed.  2) Symptom Management: Jazz will:      Jazz will identify and use 2-3 skills for coping with anxiety.    Working on developing skills to manage and stabilize mood.     Work on developing self-compassion skills and boundaries.  3) In LIfe Skills Jazz will:     Learn, practice, apply 2 skills/strategies that support lifestyle balance/structure/routine with an emphasis on self compassion    Learn, practice, generalize 2 sensory or mindfulness based self regulation skills to decrease distress and improve participation in meaningful role, routines, and relationships.  4) Will develop an aftercare / transition plan by day of discharge       Area of Treatment Focus:  Symptom Management and Develop Socialization / Interpersonal Relationship Skills    Therapeutic Interventions/Treatment Strategies:  Support, Feedback, Structured Activity, Clarification and Education    Response to Treatment Strategies:  Accepted Feedback, Gave Feedback, Listened, Focused on Goals, Attentive, Accepted Support and Alert    Name of Group:  Mental Health Management, [id number], Self Awareness [phone], 4 of 4 participants     Description and Outcome:  Mental Health Management: The group participated in a structured, psychoeducational discussion and activity comparing and contrasting self esteem and self compassion.  Self esteem was presented as a useful, but limiting construct, as if is prone to change with circumstances and relies on comparisons to others.  On the other hand, self compassion can be described as breathing the self kindly, especially during " "struggle.  Self compassion facilitates connection to others.  Handouts, including exercises to practise self compassion, were given. Jazz demonstrated understanding of session by sharing struggles with compassion, although she feels competent in some areas of life.    Self Awareness: The group was provided with a guided breathing and warmup structure with focus on increasing self awareness and on providing an embodied experience.  Discussion included the importance of listening to body cues as a way of identifying emotion as well as accompanying needs and wants, and as a way of practising self compassion, authenticity, mindfulness, self expression,  and connection to other.  The group challenged themselves to \"be\" using their breath and with a prop. Jazz demonstrated understanding of session by challenging self to practise \"being\".  She shared that she found this difficulty and was often preoccupied.    Is this a Weekly Review of the Progress on the Treatment Plan?  No        "

## 2018-10-25 NOTE — PROGRESS NOTES
"Adult Mental Health Partial Hospitalization Group Therapy Progress Note     Date: 10/24/18    Client Initial Individualized Goals for Treatment: \"Stability,  Be able to focus. Increase self worth\".     Treatment Goals:  1) Safety: Jazz will:    Notify staff when needing assistance to develop or implement a coping plan to manage suicidal or self injurious urges.    Use coping plan for safety, as needed.  2) Symptom Management: Jazz will:      Jazz will identify and use 2-3 skills for coping with anxiety.    Working on developing skills to manage and stabilize mood.     Work on developing self-compassion skills and boundaries.  3) In LIfe Skills Jazz will:     Learn, practice, apply 2 skills/strategies that support lifestyle balance/structure/routine with an emphasis on self compassion    Learn, practice, generalize 2 sensory or mindfulness based self regulation skills to decrease distress and improve participation in meaningful role, routines, and relationships.  4) Will develop an aftercare / transition plan by day of discharge    Area of Treatment Focus:  Symptom Management and Develop / Improve Independent Living Skills    Therapeutic Interventions/Treatment Strategies:  Support, Safety Assessments, Structured Activity, Problem Solving, Clarification and Education    Response to Treatment Strategies:  Accepted Feedback, Listened, Focused on Goals, Attentive, Accepted Support and Alert    Name of Group:  OT life skills clinic: Stress Management  Time: 1:00-1:50  Group member attendance: 4 of 5    Description and Outcome: Jazz attended and participated in a structured life skills group where purposeful experiential intervention focuses on psychoeducation, exploration, practice, and generalizing taught independent living skills. Through the use of supportive social interactions, structured therapeutic and functional tasks in context, group members work towards stabilizing and managing mental " health symptoms for improved participation and function in valued roles, routines, relationships, and independent living.     Jazz is independent in her chosen repetitive calming sensory enhanced activity. She reports she is working on self regulation and has identified ways to include this type of activity into her daily schedule. She is hopeful her new understanding of sensory preferences will help her with self regulation and to diminish impulsive behaviors. Validation and support provided. Jazz would benefit from additional opportunities to practice the content to be able to generalize it to her everyday life with increased intentionality, consistency, and efficacy in support of her psychiatric recovery. She worked towards goal number 3 today.    Is this a Weekly Review of the Progress on the Treatment Plan?  No.

## 2018-10-25 NOTE — PROGRESS NOTES
"Adult Mental Health Outpatient Group Therapy Progress Note     Client Initial Individualized Goals for Treatment: \"Stability,  Be able to focus. Increase self worth\".      Treatment Goals:  1) Safety: Jazz will:    Notify staff when needing assistance to develop or implement a coping plan to manage suicidal or self injurious urges.    Use coping plan for safety, as needed.  2) Symptom Management: Jazz will:      Jazz will identify and use 2-3 skills for coping with anxiety.    Working on developing skills to manage and stabilize mood.     Work on developing self-compassion skills and boundaries.  3) In LIfe Skills Jazz will:     Learn, practice, apply 2 skills/strategies that support lifestyle balance/structure/routine with an emphasis on self compassion    Learn, practice, generalize 2 sensory or mindfulness based self regulation skills to decrease distress and improve participation in meaningful role, routines, and relationships.  4) Will develop an aftercare / transition plan by day of discharge       Area of Treatment Focus:  Symptom Management, Personal Safety, Develop / Improve Independent Living Skills    Therapeutic Interventions/Treatment Strategies:  Support, Feedback, Safety Assessments, Structured Activity, Problem Solving and Education    Response to Treatment Strategies:  Accepted Feedback, Gave Feedback, Listened, Focused on Goals, Attentive, Accepted Support and Alert     Name of Group: Group Therapy Date/Time: 10/25/2018 / 0900 to 0950; Group Participants: 4 of 4  Name of Group: Interpersonal Communication Date/Time: 10/25/2018 / [id number]; Group Participants: 4 of 4    Description and Outcome:  Group therapy  Client reported feeling sad on her last day because she has formed good connections but also feels ready to use the skills she has learned.  She said she is learning to validate her feelings and take her mental health symptoms seriously.  Group members provided support and " wished client well in further therapy.  Interpersonal Communication  Client participated in a session on communication strategies.  Information on biology, environment, and previous experience affecting arousal was presented.  That was linked to communication and skills were presented to decrease arousal prior to communication.  Components of whole communication were presented and discussed among group members.  Examples were generated and problem solving barriers to effective communication were discussed among group members. Benefits of effective communication were linked to self-kindness and internal experiences in relationships resulting from improved communication.  Client demonstrated understanding by participating in problem solving and working on examples with other group members       Is this a Weekly Review of the Progress on the Treatment Plan?  Yes.       Are Treatment Plan Goals being addressed?  Yes, client discharged.        Are Treatment Plan Strategies to Address Goals Effective?  Yes, Client discharged

## 2018-10-25 NOTE — PROGRESS NOTES
"Adult Mental Health Partial Hospitalization Group Therapy Progress Note     Date: 10/23/18    Client Initial Individualized Goals for Treatment: \"Stability,  Be able to focus. Increase self worth\".     Treatment Goals:  1) Safety: Jazz will:    Notify staff when needing assistance to develop or implement a coping plan to manage suicidal or self injurious urges.    Use coping plan for safety, as needed.  2) Symptom Management: Jazz will:      Jazz will identify and use 2-3 skills for coping with anxiety.    Working on developing skills to manage and stabilize mood.     Work on developing self-compassion skills and boundaries.  3) In LIfe Skills Jazz will:     Learn, practice, apply 2 skills/strategies that support lifestyle balance/structure/routine with an emphasis on self compassion    Learn, practice, generalize 2 sensory or mindfulness based self regulation skills to decrease distress and improve participation in meaningful role, routines, and relationships.  4) Will develop an aftercare / transition plan by day of discharge    Area of Treatment Focus:  Symptom Management and Develop / Improve Independent Living Skills    Therapeutic Interventions/Treatment Strategies:  Support, Safety Assessments, Structured Activity, Problem Solving, Clarification and Education    Response to Treatment Strategies:  Accepted Feedback, Listened, Focused on Goals, Attentive, Accepted Support and Alert    Name of Group:  OT life skills: Mental health management  Time: 1:00-1:50  Group member attendance: 5 of 6     Description and Outcome: Jazz attended and participated in a structured life skills group where purposeful experiential intervention focuses on psychoeducation, exploration, practice, and generalizing taught independent living skills. Through the use of supportive social interactions, structured therapeutic and functional tasks in context, group members work towards stabilizing and managing mental " health symptoms for improved participation and function in valued roles, routines, relationships, and independent living.     Provided group with verbal and written psychoeducation material focused on lifestyle balance in order to support mental health with an emphasis on the research and evidence around 5 things we can do to help with willpower and resiliency. Concepts of sleep hygiene, mindfulness, physical activity, nutrition, and connecting were reviewed and group members were validated for ways they are working on these items. This was followed by a sensory enhanced active meditation to illustrate and experience an embodied mind experience to gain self awareness and insight into how one can use their body to help them practice mindfulness in an easy, inexpensive way.  Group member discussed ways they can apply the skill to their everyday life. Validation and support provided. Jazz would benefit from additional opportunities to practice the content to be able to generalize it to her everyday life with increased intentionality, consistency, and efficacy in support of her psychiatric recovery. She worked towards goal number 3 today.    Is this a Weekly Review of the Progress on the Treatment Plan?  No.

## 2018-10-25 NOTE — PROGRESS NOTES
"Adult Mental Health Partial Hospitalization Group Therapy Progress Note     Date: 10/25/18    Client Initial Individualized Goals for Treatment: \"Stability,  Be able to focus. Increase self worth\".     Treatment Goals:  1) Safety: Jazz will:    Notify staff when needing assistance to develop or implement a coping plan to manage suicidal or self injurious urges.    Use coping plan for safety, as needed.  2) Symptom Management: Jazz will:      Jazz will identify and use 2-3 skills for coping with anxiety.    Working on developing skills to manage and stabilize mood.     Work on developing self-compassion skills and boundaries.  3) In LIfe Skills Jazz will:     Learn, practice, apply 2 skills/strategies that support lifestyle balance/structure/routine with an emphasis on self compassion    Learn, practice, generalize 2 sensory or mindfulness based self regulation skills to decrease distress and improve participation in meaningful role, routines, and relationships.  4) Will develop an aftercare / transition plan by day of discharge    Area of Treatment Focus:  Symptom Management and Develop / Improve Independent Living Skills    Therapeutic Interventions/Treatment Strategies:  Support, Safety Assessments, Structured Activity, Problem Solving, Clarification and Education    Response to Treatment Strategies:  Accepted Feedback, Listened, Focused on Goals, Attentive, Accepted Support and Alert    Name of Group:  OT life skills clinic: Stress Management  Time: 1:00-1:50  Group member attendance: 4 of 4    Description and Outcome: Jazz attended and participated in a structured life skills group where purposeful experiential intervention focuses on psychoeducation, exploration, practice, and generalizing taught independent living skills. Through the use of supportive social interactions, structured therapeutic and functional tasks in context, group members work towards stabilizing and managing mental " health symptoms for improved participation and function in valued roles, routines, relationships, and independent living.     Jazz demonstrates good focus and results as she works on her semi structured functional activity which is something she has started doing at home as a healthy distraction. Her affect is even and calm. She reports readiness for discharge and expresses she is hopeful today. She is planning on attending a DBT program.  Validation and support provided. Jazz would benefit from additional opportunities to practice the content to be able to generalize it to her everyday life with increased intentionality, consistency, and efficacy in support of her psychiatric recovery. She worked towards goal number 3 today.    Is this a Weekly Review of the Progress on the Treatment Plan?  No.

## 2018-10-26 ASSESSMENT — ANXIETY QUESTIONNAIRES: GAD7 TOTAL SCORE: 8

## 2019-06-27 ENCOUNTER — COMMUNICATION - HEALTHEAST (OUTPATIENT)
Dept: FAMILY MEDICINE | Facility: CLINIC | Age: 24
End: 2019-06-27

## 2019-06-27 DIAGNOSIS — F41.1 GAD (GENERALIZED ANXIETY DISORDER): ICD-10-CM

## 2019-08-02 ENCOUNTER — COMMUNICATION - HEALTHEAST (OUTPATIENT)
Dept: FAMILY MEDICINE | Facility: CLINIC | Age: 24
End: 2019-08-02

## 2019-08-02 DIAGNOSIS — F41.1 GAD (GENERALIZED ANXIETY DISORDER): ICD-10-CM

## 2020-08-08 ENCOUNTER — COMMUNICATION - HEALTHEAST (OUTPATIENT)
Dept: FAMILY MEDICINE | Facility: CLINIC | Age: 25
End: 2020-08-08

## 2020-08-08 DIAGNOSIS — F41.1 GAD (GENERALIZED ANXIETY DISORDER): ICD-10-CM

## 2021-05-30 NOTE — TELEPHONE ENCOUNTER
RN cannot approve Refill Request    RN can NOT refill this medication PCP messaged that patient is overdue for Labs. Last office visit: 7/3/2018 Julita Mcgarry MD Last Physical: Visit date not found Last MTM visit: Visit date not found Last visit same specialty: 7/3/2018 Julita Mcgarry MD.  Next visit within 3 mo: Visit date not found  Next physical within 3 mo: Visit date not found      [facility] Triage/Med Refill 6/27/2019    Requested Prescriptions   Pending Prescriptions Disp Refills     venlafaxine (EFFEXOR-XR) 75 MG 24 hr capsule [Pharmacy Med Name: VENLAFAXINE ER 75MG CAPSULES] 90 capsule 0     Sig: TAKE 1 CAPSULE(75 MG) BY MOUTH DAILY       Venlafaxine/Desvenlafaxine Refill Protocol Failed - 6/27/2019  8:05 PM        Failed - LFT or AST or ALT in last year     No results found for: ALBUMIN, BILITOT, BILIDIR, ALKPHOS, AST, ALT, PROT             Passed - Fasting lipid cascade in last year     Cholesterol   Date Value Ref Range Status   07/03/2018 208 (H) <=199 mg/dL Final     Triglycerides   Date Value Ref Range Status   07/03/2018 92 <=149 mg/dL Final     HDL Cholesterol   Date Value Ref Range Status   07/03/2018 74 >=50 mg/dL Final     LDL Calculated   Date Value Ref Range Status   07/03/2018 116 <=129 mg/dL Final     Patient Fasting > 8hrs?   Date Value Ref Range Status   07/03/2018 Yes  Final             Passed - PCP or prescribing provider visit in last year     Last office visit with prescriber/PCP: 7/3/2018 Julita Mcgarry MD OR same dept: 7/3/2018 Julita Mcgarry MD OR same specialty: 7/3/2018 Julita Mcgarry MD  Last physical: Visit date not found Last MTM visit: Visit date not found   Next visit within 3 mo: Visit date not found  Next physical within 3 mo: Visit date not found  Prescriber OR PCP: Julita Mcgarry MD  Last diagnosis associated with med order: There are no diagnoses linked to this encounter.  If protocol passes may refill for 12 months if within 3 months of last provider  visit (or a total of 15 months).             Passed - Blood Pressure in last year     BP Readings from Last 1 Encounters:   07/03/18 120/86

## 2021-05-31 NOTE — TELEPHONE ENCOUNTER
RN cannot approve Refill Request    RN can NOT refill this medication Protocol failed and NO refill given.       Radhika Salas, [facility] Triage/Med Refill 8/2/2019    Requested Prescriptions   Pending Prescriptions Disp Refills     venlafaxine (EFFEXOR-XR) 150 MG 24 hr capsule [Pharmacy Med Name: VENLAFAXINE ER 150MG CAPSULES] 90 capsule 0     Sig: TAKE 1 CAPSULE BY MOUTH DAILY WITH 75 MG CAPSULE       Venlafaxine/Desvenlafaxine Refill Protocol Failed - 8/2/2019  4:42 PM        Failed - LFT or AST or ALT in last year     No results found for: ALBUMIN, BILITOT, BILIDIR, ALKPHOS, AST, ALT, PROT             Failed - Fasting lipid cascade in last year     Cholesterol   Date Value Ref Range Status   07/03/2018 208 (H) <=199 mg/dL Final     Triglycerides   Date Value Ref Range Status   07/03/2018 92 <=149 mg/dL Final     HDL Cholesterol   Date Value Ref Range Status   07/03/2018 74 >=50 mg/dL Final     LDL Calculated   Date Value Ref Range Status   07/03/2018 116 <=129 mg/dL Final     Patient Fasting > 8hrs?   Date Value Ref Range Status   07/03/2018 Yes  Final             Failed - PCP or prescribing provider visit in last year     Last office visit with prescriber/PCP: 7/3/2018 Julita Mcgarry MD OR same dept: Visit date not found OR same specialty: 7/3/2018 Julita Mcgarry MD  Last physical: Visit date not found Last MTM visit: Visit date not found   Next visit within 3 mo: Visit date not found  Next physical within 3 mo: Visit date not found  Prescriber OR PCP: Julita Mcgarry MD  Last diagnosis associated with med order: There are no diagnoses linked to this encounter.  If protocol passes may refill for 12 months if within 3 months of last provider visit (or a total of 15 months).             Failed - Blood Pressure in last year     BP Readings from Last 1 Encounters:   07/03/18 120/86

## 2021-06-01 VITALS — HEIGHT: 67 IN | BODY MASS INDEX: 25.6 KG/M2 | WEIGHT: 163.1 LBS

## 2021-06-10 NOTE — TELEPHONE ENCOUNTER
Please help her video visit today or office visit wed or Thursday   We haven't seen pt since 2018    Julita Mcgarry MD 8/10/2020 7:44 AM

## 2021-06-10 NOTE — TELEPHONE ENCOUNTER
RN cannot approve Refill Request    RN can NOT refill this medication Protocol failed and NO refill given. Last office visit: 7/3/2018 Julita Mcgarry MD Last Physical: Visit date not found Last MTM visit: Visit date not found Last visit same specialty: 7/3/2018 Julita Mcgarry MD.  Next visit within 3 mo: Visit date not found  Next physical within 3 mo: Visit date not found      Maryjo Leigh, [facility] Triage/Med Refill 8/8/2020    Requested Prescriptions   Pending Prescriptions Disp Refills     venlafaxine (EFFEXOR-XR) 150 MG 24 hr capsule [Pharmacy Med Name: VENLAFAXINE 150MG ER CAPSULES] 90 capsule 3     Sig: TAKE 1 CAPSULE BY MOUTH DAILY WITH 75 MG CAPSULE       Venlafaxine/Desvenlafaxine Refill Protocol Failed - 8/8/2020  4:59 PM        Failed - LFT or AST or ALT in last year     No results found for: ALBUMIN, BILITOT, BILIDIR, ALKPHOS, AST, ALT, PROT             Failed - Fasting lipid cascade in last year     Cholesterol   Date Value Ref Range Status   07/03/2018 208 (H) <=199 mg/dL Final     Triglycerides   Date Value Ref Range Status   07/03/2018 92 <=149 mg/dL Final     HDL Cholesterol   Date Value Ref Range Status   07/03/2018 74 >=50 mg/dL Final     LDL Calculated   Date Value Ref Range Status   07/03/2018 116 <=129 mg/dL Final     Patient Fasting > 8hrs?   Date Value Ref Range Status   07/03/2018 Yes  Final             Failed - PCP or prescribing provider visit in last year     Last office visit with prescriber/PCP: 7/3/2018 Julita Mcgarry MD OR same dept: Visit date not found OR same specialty: 7/3/2018 Julita Mcgarry MD  Last physical: Visit date not found Last MTM visit: Visit date not found   Next visit within 3 mo: Visit date not found  Next physical within 3 mo: Visit date not found  Prescriber OR PCP: Julita Mcgarry MD  Last diagnosis associated with med order: 1. BRUNO (generalized anxiety disorder)  - venlafaxine (EFFEXOR-XR) 150 MG 24 hr capsule [Pharmacy Med Name: VENLAFAXINE 150MG ER  CAPSULES]; TAKE 1 CAPSULE BY MOUTH DAILY WITH 75 MG CAPSULE  Dispense: 90 capsule; Refill: 3    If protocol passes may refill for 12 months if within 3 months of last provider visit (or a total of 15 months).             Failed - Blood Pressure in last year     BP Readings from Last 1 Encounters:   07/03/18 120/86

## 2021-06-10 NOTE — TELEPHONE ENCOUNTER
Called Jazz and she is working with a psychiatrist and did not request the medication refill. Wanda Fields LPN

## 2021-06-19 NOTE — PROGRESS NOTES
FEMALE PREVENTATIVE EXAM    Assessment and Plan:       Jazz was seen today for establish care and medication management.    Encounter for routine history and physical exam in female    Screening for metabolic disorder  -     Lipid Cascade  -     Glycosylated Hemoglobin A1c    Screen for STD (sexually transmitted disease)  -     Chlamydia trachomatis & Neisseria gonorrhoeae, Amplified Detection    IUD (intrauterine device) in place    BRUNO (generalized anxiety disorder)  Comments:  could have bipolar depression , was taking lithium only for shot term basis then stopped her self , social drinking and marijuana   Orders:  -     Ambulatory referral to Psychology    Panic attack  -     Ambulatory referral to Psychology  -     Thyroid Stimulating Hormone (TSH)    Bipolar affective disorder, currently depressed, moderate (H)  Comments:  most recent manic episode 2 wk ago , each manic episode last 2-3 days then crash into derpession , started on lamictal for now at night   Orders:  -     Ambulatory referral to Psychiatry    PND (post-nasal drip)    Other orders  -     venlafaxine (EFFEXOR-XR) 150 MG 24 hr capsule; TK 1 C PO QD ALONG WITH 75 MG C  -     venlafaxine (EFFEXOR XR) 75 MG 24 hr capsule; Take 1 capsule (75 mg total) by mouth daily.  -     lamoTRIgine (LAMICTAL) 25 MG tablet; Take 1 tablet (25 mg total) by mouth 2 (two) times a day.  -     fluticasone (FLONASE ALLERGY RELIEF) 50 mcg/actuation nasal spray; 2 spray each side of the nose at bed time , please also use nasal saline drops to prevent dryness    started on lamictal today as a mood stabilizer till she establish care with psychiatrist and psychologist , refill only on effexor and labs done will Follow up     Next follow up:  3 month to Follow up on med if no socorro with psych then one month  Immunization Review    There are no preventive care reminders to display for this patient.    Immunization History   Administered Date(s) Administered     Dtap  1996, 1996, 1996, 09/25/1997, 03/30/2001     HPV Quadrivalent 03/21/2012, 05/21/2012, 11/23/2012     Hep B, Peds or Adolescent 1996     Hep B, historic 1996, 1996     Hepatitis A, Ped/Adol 2 Dose IM (18yr & under) 06/21/2012, 11/23/2012     Influenza, Seasonal, Inj PF IIV3 11/21/2006, 11/19/2007, 10/26/2011, 11/23/2012     Influenza,seasonal quad, PF, 36+MOS 11/27/2013     Influenza,seasonal, Inj IIV3 11/17/2008, 11/13/2009     MMR 09/25/1997, 03/30/2001     Meningococcal MCV4P 07/01/2014     Meningococcal MPSV4, SQ 07/01/2014     Novel Influenza [id number], Nasal 11/13/2009     POLIO, Unspecified 1996, 1996, 1996, 03/30/2001     Tdap 11/19/2007     Typhoid Live, Oral 06/21/2012     Typhus, Historical 06/21/2012     Varicella 03/30/2001, 11/19/2007       Adult Imm Review: No immunizations due today  I discussed the following with the patient:   Adult Healthy Living: Importance of regular exercise  Healthy nutrition  Getting adequate sleep  Stress management  Supplement use  Herbal medications/alternative medical therapies    The following high BMI interventions were performed this visit: dietary management education, guidance, and counseling    I have had an Advance Directives discussion with the patient.    Subjective:   Chief Complaint: Jazz Nuñez is an 23 y.o. female here for a preventative health visit.     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 She complains of depressed mood, feelings of worthlessness/guilt, hypersomnia, impaired memory, psychomotor agitation, weight gain and no suicidal ideation . Onset was approximately since age 17 parents    , gradually worsening since that time.  She denies  "current suicidal and homicidal plan or intent.   Family history significant for alcoholism, anxiety, depression and bipolar depression .Possible organic causes contributing are: none.  Risk factors: positive family history in  brother(s), father and mother Previous treatment includes benzodiazepines xanax as needed for panic attack , Effexor and Wellbutrin and individual therapy. She complains of the following side effects from the treatment: GI disturbance, insomnia and weight gain.  .  Patient's last menstrual period was 07/03/2017 (approximate).       Healthy Habits  Are you taking a daily aspirin? No  Do you typically exercising at least 40 min, 3-4 times per week?  Yes  Do you usually eat at least 4 servings of fruit and vegetables a day, include whole grains and fiber and avoid regularly eating high fat foods? Yes  Have you had an eye exam in the past two years? Yes  Do you see a dentist twice per year? Yes  Do you have any concerns regarding sleep? No    Safety Screen  If you own firearms, are they secured in a locked gun cabinet or with trigger locks? The patient does not own any firearms    Do you feel you are safe where you are living?: Yes (7/3/2018 11:35 AM)  Do you feel you are safe in your relationship(s)?: Yes (7/3/2018 11:35 AM)    Review of Systems:  Please see above.  The rest of the review of systems are negative for all systems.     Pap History:   No - age 21-29 PAP every 3 years recommended    Cancer Screening       Status Date      PAP SMEAR Overdue 3/5/2017           Patient Care Team:  Julita Mcgarry MD as PCP - General (Family Medicine)        History     Reviewed By Date/Time Sections Reviewed    Gale Beckwith CMA 7/3/2018 11:36 AM Tobacco, Alcohol, Drug Use, Sexual Activity            Objective:   Vital Signs:   Visit Vitals     /86 (Patient Site: Left Arm, Patient Position: Sitting, Cuff Size: Adult Regular)     Pulse 96     Ht 5' 7\" (1.702 m)     Wt 163 lb 1.6 oz (74 kg)     LMP " 07/03/2017 (Approximate)     Breastfeeding No     BMI 25.55 kg/m2        PHYSICAL EXAM    General: Alert, no acute distress.   HEENT: normocephalic conjunctivae are clear, Normal pearly TMs bilaterally without erythema, pus or fluid.   Nose is clear.  Oropharynx is moist and clear,     Neck: supple without adenopathy or thyromegaly.  Lungs: Good aeration bilaterally. No prolongation of expiratory phase.   No tachypnea, retractions, or increased work of breathing. Clear to auscultation without wheezes, rales or rhonci.    Heart: regular rate and rhythm, normal S1 and S2, no murmurs  Abdomen: soft and nontender, bowel sounds are present, no hepatosplenomegaly or mass palpable.  Skin: clear without rash or lesions  Neuro: alert, interactive moving all extremities equally, normal muscle tone in all 4 extremities, deep tendon reflexes 2+ symmetrically at the patella               Medication List          These changes are accurate as of 7/3/18 12:16 PM.  If you have any questions, ask your nurse or doctor.               START taking these medications          fluticasone 50 mcg/actuation nasal spray   Also known as:  FLONASE ALLERGY RELIEF   INSTRUCTIONS:  2 spray each side of the nose at bed time , please also use nasal saline drops to prevent dryness   Started by:  Julita Mcgarry MD           lamoTRIgine 25 MG tablet   Also known as:  LaMICtal   INSTRUCTIONS:  Take 1 tablet (25 mg total) by mouth 2 (two) times a day.   Started by:  Julita Mcgarry MD             CHANGE how you take these medications          * venlafaxine 150 MG 24 hr capsule   Also known as:  EFFEXOR-XR   INSTRUCTIONS:  TK 1 C PO QD ALONG WITH 75 MG C   What changed:  See the new instructions.   Changed by:  Julita Mcgarry MD           * venlafaxine 75 MG 24 hr capsule   Also known as:  EFFEXOR XR   INSTRUCTIONS:  Take 1 capsule (75 mg total) by mouth daily.   What changed:  You were already taking a medication with the same name, and this prescription was  added. Make sure you understand how and when to take each.   Changed by:  Julita Mcgarry MD           * Notice:  This list has 2 medication(s) that are the same as other medications prescribed for you. Read the directions carefully, and ask your doctor or other care provider to review them with you.      CONTINUE taking these medications          ALPRAZolam 0.5 MG tablet   Also known as:  XANAX   INSTRUCTIONS:  Take 0.5 mg by mouth at bedtime as needed for sleep.           MIRENA 20 mcg/24 hr (5 years) IUD   INSTRUCTIONS:  1 each by Intrauterine route once.   Generic drug:  levonorgestrel                Where to Get Your Medications      These medications were sent to [facility] [address] [place] [address] [address] AT [place] [address] [facility] [address]     Phone:  [phone]      fluticasone 50 mcg/actuation nasal spray     lamoTRIgine 25 MG tablet     venlafaxine 150 MG 24 hr capsule     venlafaxine 75 MG 24 hr capsule             Additional Screenings Completed Today:

## 2021-07-03 NOTE — ADDENDUM NOTE
Addendum Note by Toshia Benitez MLT at 7/5/2018  7:32 AM     Author: Toshia Benitez MLT Service: -- Author Type:     Filed: 7/5/2018  7:32 AM Encounter Date: 7/3/2018 Status: Signed    : Toshia Benitez MLT ()    Addended by: TOSHIA BENITEZ on: 7/5/2018 07:32 AM        Modules accepted: Orders